# Patient Record
Sex: FEMALE | Race: WHITE | URBAN - METROPOLITAN AREA
[De-identification: names, ages, dates, MRNs, and addresses within clinical notes are randomized per-mention and may not be internally consistent; named-entity substitution may affect disease eponyms.]

---

## 2017-01-02 ENCOUNTER — APPOINTMENT (OUTPATIENT)
Dept: RADIATION ONCOLOGY | Facility: CLINIC | Age: 37
End: 2017-01-02
Attending: RADIOLOGY
Payer: COMMERCIAL

## 2017-01-02 PROCEDURE — 77412 RADIATION TX DELIVERY LVL 3: CPT | Performed by: RADIOLOGY

## 2017-01-03 ENCOUNTER — APPOINTMENT (OUTPATIENT)
Dept: RADIATION ONCOLOGY | Facility: CLINIC | Age: 37
End: 2017-01-03
Attending: RADIOLOGY
Payer: COMMERCIAL

## 2017-01-03 PROCEDURE — 77336 RADIATION PHYSICS CONSULT: CPT | Performed by: RADIOLOGY

## 2017-01-03 PROCEDURE — 77412 RADIATION TX DELIVERY LVL 3: CPT | Performed by: RADIOLOGY

## 2017-01-04 ENCOUNTER — APPOINTMENT (OUTPATIENT)
Dept: RADIATION ONCOLOGY | Facility: CLINIC | Age: 37
End: 2017-01-04
Attending: RADIOLOGY
Payer: COMMERCIAL

## 2017-01-04 PROCEDURE — 77412 RADIATION TX DELIVERY LVL 3: CPT | Performed by: RADIOLOGY

## 2017-01-05 ENCOUNTER — APPOINTMENT (OUTPATIENT)
Dept: RADIATION ONCOLOGY | Facility: CLINIC | Age: 37
End: 2017-01-05
Attending: RADIOLOGY
Payer: COMMERCIAL

## 2017-01-05 DIAGNOSIS — D05.11 DUCTAL CARCINOMA IN SITU (DCIS) OF RIGHT BREAST: Primary | ICD-10-CM

## 2017-01-05 PROCEDURE — 77280 THER RAD SIMULAJ FIELD SMPL: CPT | Performed by: RADIOLOGY

## 2017-01-05 PROCEDURE — 77412 RADIATION TX DELIVERY LVL 3: CPT | Performed by: RADIOLOGY

## 2017-01-05 NOTE — Clinical Note
Date:January 9, 2017      Provider requested that no letter be sent. Do not send.       [facility] Health Information

## 2017-01-05 NOTE — PROGRESS NOTES
[facility] PHYSICIANS  SPECIALIZING IN BREAKTHROUGHS  Radiation Oncology    On Treatment Visit Note      Soraida Shen      Date: 2017   MRN: [id number]   : 1980  Diagnosis: breast cancer      Reason for Visit:  On Radiation Treatment Visit     Treatment Summary to Date  Treatment Site: R chest Current Dose: 4490/5240 cGy Fractions:       Chemotherapy  Chemo concurrent with radx?: No    Subjective:   April is doing well.  She is getting used to her routine and no longer has significant anxiety. She does report increasing tenderness of her breast and swelling of her nipple.  She states that sometimes her breast looks purple, but the color does improve several hours after treatment.    Objective:   There were no vitals taken for this visit.  Gen: Appears well, in no acute distress  Skin: Mild diffuse erythema over treatment field    Assessment:    Tolerating radiation therapy well.  All questions and concerns addressed.    Toxicities:  Fatigue: Grade 0: No toxicity  Dermatitis: Grade 1: Faint erythema or dry desquamation    Plan:   1. Continue current therapy.    2. Reassured her that her skin reaction is well within the expected range.  For tenderness, she could take aleve.  3. Discussed radiation schedule.    Mosaiq chart and setup information reviewed  Ports checked    Medication Review  Med list reviewed with patient?: Yes    Educational Topic Discussed  Education Instructions: reviewed    Porfirio Whatley MD  Radiation Oncology Resident, PGY-2  [facility]  Phone: [phone]    I saw and examined the patient with the resident.  I have reviewed and agree with the resident's note and plan of care.      Chico Cerda MD    Please do not send letter to referring physician.

## 2017-01-05 NOTE — Clinical Note
2017       RE: Soraida Shen  [address]  [place] [address]     Dear Colleague,    Thank you for referring your patient, Soraida Shen, to the RADIATION ONCOLOGY CLINIC. Please see a copy of my visit note below.    [facility] PHYSICIANS  SPECIALIZING IN BREAKTHROUGHS  Radiation Oncology    On Treatment Visit Note      Soraida Shen      Date: 2017   MRN: [id number]   : 1980  Diagnosis: breast cancer      Reason for Visit:  On Radiation Treatment Visit     Treatment Summary to Date  Treatment Site: R chest Current Dose: 4490/5240 cGy Fractions:       Chemotherapy  Chemo concurrent with radx?: No    Subjective:   April is doing well.  She is getting used to her routine and no longer has significant anxiety. She does report increasing tenderness of her breast and swelling of her nipple.  She states that sometimes her breast looks purple, but the color does improve several hours after treatment.    Objective:   There were no vitals taken for this visit.  Gen: Appears well, in no acute distress  Skin: Mild diffuse erythema over treatment field    Assessment:    Tolerating radiation therapy well.  All questions and concerns addressed.    Toxicities:  Fatigue: Grade 0: No toxicity  Dermatitis: Grade 1: Faint erythema or dry desquamation    Plan:   1. Continue current therapy.    2. Reassured her that her skin reaction is well within the expected range.  For tenderness, she could take aleve.  3. Discussed radiation schedule.    Mosaiq chart and setup information reviewed  Ports checked    Medication Review  Med list reviewed with patient?: Yes    Educational Topic Discussed  Education Instructions: reviewed    Porfirio Whatley MD  Radiation Oncology Resident, PGY-2  [facility]  Phone: [phone]    I saw and examined the patient with the resident.  I have reviewed and agree with the resident's note and plan of care.      Chico Cerda,  MD    Please do not send letter to referring physician.      Again, thank you for allowing me to participate in the care of your patient.      Sincerely,    Chico Cerda MD

## 2017-01-06 ENCOUNTER — APPOINTMENT (OUTPATIENT)
Dept: RADIATION ONCOLOGY | Facility: CLINIC | Age: 37
End: 2017-01-06
Attending: RADIOLOGY
Payer: COMMERCIAL

## 2017-01-06 PROCEDURE — 77412 RADIATION TX DELIVERY LVL 3: CPT | Performed by: RADIOLOGY

## 2017-01-12 ENCOUNTER — OFFICE VISIT (OUTPATIENT)
Dept: RADIATION ONCOLOGY | Facility: CLINIC | Age: 37
End: 2017-01-12
Attending: RADIOLOGY
Payer: COMMERCIAL

## 2017-01-12 DIAGNOSIS — D05.11 DUCTAL CARCINOMA IN SITU (DCIS) OF RIGHT BREAST: Primary | ICD-10-CM

## 2017-01-12 DIAGNOSIS — L29.9 ITCHY SKIN: ICD-10-CM

## 2017-01-12 PROCEDURE — 77336 RADIATION PHYSICS CONSULT: CPT | Performed by: RADIOLOGY

## 2017-01-12 PROCEDURE — 77412 RADIATION TX DELIVERY LVL 3: CPT | Performed by: RADIOLOGY

## 2017-01-12 RX ORDER — HYDROCORTISONE 2.5 %
CREAM (GRAM) TOPICAL 2 TIMES DAILY
Qty: 90 G | Refills: 1 | Status: SHIPPED | OUTPATIENT
Start: 2017-01-12 | End: 2017-06-05

## 2017-01-12 NOTE — Clinical Note
2017       RE: Soraida Shen  [address]  [place] [address]     Dear Colleague,    Thank you for referring your patient, Soraida Shen, to the RADIATION ONCOLOGY CLINIC. Please see a copy of my visit note below.    [facility] PHYSICIANS  SPECIALIZING IN BREAKTHROUGHS  Radiation Oncology    On Treatment Visit Note      Soraida Shen      Date: 2017   MRN: [id number]   : 1980  Diagnosis: breast cancer      Reason for Visit:  On Radiation Treatment Visit     Treatment Summary to Date  Treatment Site: R chest Current Dose: 5240/5240 cGy Fractions:       Chemotherapy  Chemo concurrent with radx?: No    Subjective:   April completed treatment today.  She has an itchy rash over the treated breast which is not improved with 1% HC cream.  Her nipple feels dry.  She otherwise has no complaints.     Nursing ROS:   Nutrition Alteration  Diet Type: Patient's Preference  Nutrition Note: appetite good  Skin  Skin Reaction: 1 - Faint erythema or dry desquamation  Skin Note: will try some hydrocortisone as well as aquaphor (still itchy may want to try some 2.5% hydrocortisone cream)        Cardiovascular  Respiratory effort: 1 - Normal - without distress  Gastrointestinal  GI Note: WNL     Psychosocial  Mood - Anxiety: 1 - Mild mood alteration  Pain Assessment  0-10 Pain Scale: 4  Pain Note: see above      Objective:   There were no vitals taken for this visit.  Gen: Appears well, in no acute distress  Skin: Mild diffuse erythema over treatment field with erythematous papules.     Labs:  CBC RESULTS: No results for input(s): WBC, RBC, HGB, HCT, MCV, MCH, MCHC, RDW, PLT in the last 47938 hours.  ELECTROLYTES:  No results for input(s): NA, POTASSIUM, CHLORIDE, SANAZ, CO2, BUN, CR, GLC in the last 27083 hours.    Assessment:    Tolerating radiation therapy well.  All questions and concerns addressed.    Toxicities:  Dermatitis: Grade 1: Faint erythema or dry desquamation    Plan:    1. 2.5% HC cream for itching.  2. FU in 1 month.       Mosaiq chart and setup information reviewed  Ports checked    Medication Review  Med list reviewed with patient?: Yes    Educational Topic Discussed  Additional Instructions: D/C instrcutions per RN  Education Instructions: reviewed        Chico Cerda MD/PhD  [phone] clinic  Pager [phone]    Please do not send letter to referring physician.      Again, thank you for allowing me to participate in the care of your patient.      Sincerely,    Chico Cerda MD

## 2017-01-12 NOTE — Clinical Note
Date:January 13, 2017      Provider requested that no letter be sent. Do not send.       [facility] Health Information

## 2017-01-12 NOTE — MR AVS SNAPSHOT
After Visit Summary   1/12/2017 April Cindy Shen    MRN: [id number]           Patient Information     Date Of Birth          1980        Visit Information        Provider Department      1/12/2017 3:30 PM Chico Cerda MD Radiation Oncology Clinic        Today's Diagnoses     Ductal carcinoma in situ (DCIS) of right breast    -  1       Care Instructions    Continuing Management of the Effects of Radiation Treatment    The side effects of radiation therapy should gradually decrease in 2 to 3 weeks after you have finished radiation.  Some effects take longer to resolve.    Skin reactions:  Skin changes (such as redness or irritation) should begin to get better gradually.  Some people will have a permanent change in skin color.  Their skin may be more pink or  tan  than the untreated skin.  The skin may be thinner or more fragile than before treatment.  Continue to use a gentle moisturizing lotion for several months.  You should always protect the skin in the area that was treated by using sunscreen of spf 30 or higher.      Other skin care instructions:    Fatigue caused by radiation therapy will decrease and your energy will improve.    For concerns or questions call Department of Therapeutic Radiology [phone]        Follow-ups after your visit        Follow-up notes from your care team     Return in about 4 weeks (around 2/9/2017).      Your next 10 appointments already scheduled     Jan 12, 2017  3:30 PM   EXTERNAL RADIATION TREATMENT with [facility] RAD ONC VARIAN   Radiation Oncology Clinic ([facility])    [facility]  1st Floor  [address] [facility] [address] [phone]            Jan 12, 2017  3:30 PM   ON TREATMENT VISIT with Chico Cerda MD   Radiation Oncology Clinic ([facility])    [facility]  1st Floor  [address] [facility] [address]   [phone]            Jun 05, 2017  1:00 PM    (Arrive by 12:45 PM)   MA DIAGNOSTIC DIGITAL BILATERAL with UCBCMA2   [facility] ([facility])    [address] [address] [phone]           Do not use any powder, lotion or deodorant under your arms or on your breast. If you do, we will ask you to remove it before your exam.  Wear comfortable, two-piece clothing.  If you have any allergies, tell your care team.  Bring any previous mammograms from other facilities or have them mailed to the breast center.            Jun 05, 2017  2:15 PM   (Arrive by 2:00 PM)   Return Visit with Valerie Delgado MD   [facility] ([facility])    [address] [place] [address] [phone]              Who to contact     Please call your clinic at [phone] to:    Ask questions about your health    Make or cancel appointments    Discuss your medicines    Learn about your test results    Speak to your doctor   If you have compliments or concerns about an experience at your clinic, or if you wish to file a complaint, please contact [facility] Physicians Patient Relations at [phone] or email us at [email or web address]         Additional Information About Your Visit        [employer] Information     Momo gives you secure access to your electronic health record. If you see a primary care provider, you can also send messages to your care team and make appointments. If you have questions, please call your primary care clinic.  If you do not have a primary care provider, please call [phone] and they will assist you.      Momo is an electronic gateway that provides easy, online access to your medical records. With [employer], you can request a clinic appointment, read your test results, renew a prescription or communicate with your care team.     To access your existing account, please  contact your [facility]icians Clinic or call [phone] for assistance.        Care EveryWhere ID     This is your Care EveryWhere ID. This could be used by other organizations to access your [place] medical records  [id number]         Blood Pressure from Last 3 Encounters:   11/22/16 118/67   10/31/16 113/64   10/11/16 115/68    Weight from Last 3 Encounters:   12/22/16 80.74 kg (178 lb)   12/14/16 75.751 kg (167 lb)   10/11/16 77.157 kg (170 lb 1.6 oz)              Today, you had the following     No orders found for display       Primary Care Provider Office Phone # Fax #    Bita Perez [phone]       [address] [address]        Thank you!     Thank you for choosing RADIATION ONCOLOGY CLINIC  for your care. Our goal is always to provide you with excellent care. Hearing back from our patients is one way we can continue to improve our services. Please take a few minutes to complete the written survey that you may receive in the mail after your visit with us. Thank you!             Your Updated Medication List - Protect others around you: Learn how to safely use, store and throw away your medicines at www.disposemymeds.org.          This list is accurate as of: 1/12/17  3:00 PM.  Always use your most recent med list.                   Brand Name Dispense Instructions for use    bromocriptine 2.5 MG tablet    PARLODEL    180 tablet    Take 1 tablet (2.5 mg) by mouth 2 times daily       * LORazepam 0.25 MG Tabs half-tab    ATIVAN     Take 0.25 mg by mouth as needed       * LORazepam 0.5 MG tablet    ATIVAN    10 tablet    Take 1 tablet (0.5 mg) by mouth every 6 hours as needed (panic attack)       * Notice:  This list has 2 medication(s) that are the same as other medications prescribed for you. Read the directions carefully, and ask your doctor or other care provider to review them with you.

## 2017-01-12 NOTE — PROGRESS NOTES
[facility] PHYSICIANS  SPECIALIZING IN BREAKTHROUGHS  Radiation Oncology    On Treatment Visit Note      April Cindy Shen      Date: 2017   MRN: [id number]   : 1980  Diagnosis: breast cancer      Reason for Visit:  On Radiation Treatment Visit     Treatment Summary to Date  Treatment Site: R chest Current Dose: 5240/5240 cGy Fractions: 20/20      Chemotherapy  Chemo concurrent with radx?: No    Subjective:   April completed treatment today.  She has an itchy rash over the treated breast which is not improved with 1% HC cream.  Her nipple feels dry.  She otherwise has no complaints.     Nursing ROS:   Nutrition Alteration  Diet Type: Patient's Preference  Nutrition Note: appetite good  Skin  Skin Reaction: 1 - Faint erythema or dry desquamation  Skin Note: will try some hydrocortisone as well as aquaphor (still itchy may want to try some 2.5% hydrocortisone cream)        Cardiovascular  Respiratory effort: 1 - Normal - without distress  Gastrointestinal  GI Note: WNL     Psychosocial  Mood - Anxiety: 1 - Mild mood alteration  Pain Assessment  0-10 Pain Scale: 4  Pain Note: see above      Objective:   There were no vitals taken for this visit.  Gen: Appears well, in no acute distress  Skin: Mild diffuse erythema over treatment field with erythematous papules.     Labs:  CBC RESULTS: No results for input(s): WBC, RBC, HGB, HCT, MCV, MCH, MCHC, RDW, PLT in the last 06545 hours.  ELECTROLYTES:  No results for input(s): NA, POTASSIUM, CHLORIDE, SANAZ, CO2, BUN, CR, GLC in the last 72439 hours.    Assessment:    Tolerating radiation therapy well.  All questions and concerns addressed.    Toxicities:  Dermatitis: Grade 1: Faint erythema or dry desquamation    Plan:   1. 2.5% HC cream for itching.  2. FU in 1 month.       [employer] chart and setup information reviewed  Ports checked    Medication Review  Med list reviewed with patient?: Yes    Educational Topic Discussed  Additional Instructions: D/C  instrcutions per RN  Education Instructions: reviewed        Chico Cerda MD/PhD  [phone] clinic  Pager [phone]    Please do not send letter to referring physician.

## 2017-01-12 NOTE — PATIENT INSTRUCTIONS
Continuing Management of the Effects of Radiation Treatment    The side effects of radiation therapy should gradually decrease in 2 to 3 weeks after you have finished radiation.  Some effects take longer to resolve.    Skin reactions:  Skin changes (such as redness or irritation) should begin to get better gradually.  Some people will have a permanent change in skin color.  Their skin may be more pink or  tan  than the untreated skin.  The skin may be thinner or more fragile than before treatment.  Continue to use a gentle moisturizing lotion for several months.  You should always protect the skin in the area that was treated by using sunscreen of spf 30 or higher.      Other skin care instructions:    Fatigue caused by radiation therapy will decrease and your energy will improve.    For concerns or questions call Department of Therapeutic Radiology [phone]

## 2017-01-16 ENCOUNTER — ONCOLOGY VISIT (OUTPATIENT)
Dept: RADIATION ONCOLOGY | Facility: CLINIC | Age: 37
End: 2017-01-16

## 2017-01-21 NOTE — PROCEDURES
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     Treatment Summary:  Radiation Oncology - Course: 1 Protocol:   Treatment Site Dose             Modality From To Days Fx.  1 R breast  4,240 cGy     6X/10X 12/13/2016  2017  22 16  1 R breast boost  1,000 cGy e12  2017   7  4             Dose per Fraction: 265 / 250 cGy     Total Dose: 5240 cGy              COMMENTS:   Ms. Reid is a 36-year-old woman with a history of a prolactin-secreting micro pituitary adenoma   with a newly diagnosed low-grade DCIS of the lower outer quadrant of the right breast, status post   lumpectomy, 0.3 cm with 1 mm margin, ER/NC positive, [id number] (Stage 0).  She underwent adjuvant whole   breast radiotherapy to the right breast as above.  She tolerated treatment well with no unexpected treatment   breaks. The patient had toxicities of a maximum of grade 1 skin reaction and grade 1 anxiety by CTC AE v4.0   criteria.  Additionally she had grade 1 respiratory symptoms during treatment, although this was unlikely related   to radiotherapy administration.  She managed her skin reaction with 2.5% hydrocortisone cream.      PAIN MANAGEMENT:  The patient did not require pain management above OTC medication during treatment.    She will be released to the care of Medical Oncology for any further pain management.                              FOLLOW UP PLAN:  The patient will return for follow-up in radiation oncology in approximately 1 month.                               Resident Physician:  Porfirio Whatley  M.D.   Staff Physician: Chico Cerda M.D.  Physicist: Horacio Raygoza, PhD     CC:   MD Valerie Worley MD Jane Hui, MD                                      Radiation Oncology:  [facility] [address] [place] [address]

## 2017-02-13 ENCOUNTER — OFFICE VISIT (OUTPATIENT)
Dept: RADIATION ONCOLOGY | Facility: CLINIC | Age: 37
End: 2017-02-13
Attending: RADIOLOGY
Payer: COMMERCIAL

## 2017-02-13 DIAGNOSIS — D05.11 DUCTAL CARCINOMA IN SITU (DCIS) OF RIGHT BREAST: Primary | ICD-10-CM

## 2017-02-13 PROCEDURE — 99214 OFFICE O/P EST MOD 30 MIN: CPT | Performed by: RADIOLOGY

## 2017-02-13 NOTE — MR AVS SNAPSHOT
After Visit Summary   2/13/2017    Soraida Shen    MRN: [id number]           Patient Information     Date Of Birth          1980        Visit Information        Provider Department      2/13/2017 3:30 PM Chico Cerda MD Radiation Oncology Clinic        Today's Diagnoses     Ductal carcinoma in situ (DCIS) of right breast    -  1       Follow-ups after your visit        Your next 10 appointments already scheduled     Jun 05, 2017  1:00 PM CDT   (Arrive by 12:45 PM)   MA DIAGNOSTIC DIGITAL BILATERAL with UCBCMA2   [facility] [place] [facility] ([facility])    [address] [place] [address] [phone]           Do not use any powder, lotion or deodorant under your arms or on your breast. If you do, we will ask you to remove it before your exam.  Wear comfortable, two-piece clothing.  If you have any allergies, tell your care team.  Bring any previous mammograms from other facilities or have them mailed to the breast center.            Jun 05, 2017  2:15 PM CDT   (Arrive by 2:00 PM)   Return Visit with Valerie Delgado MD   [facility] ([facility])    [address] [place] [address] [phone]              Who to contact     Please call your clinic at [phone] to:    Ask questions about your health    Make or cancel appointments    Discuss your medicines    Learn about your test results    Speak to your doctor   If you have compliments or concerns about an experience at your clinic, or if you wish to file a complaint, please contact [facility] Physicians Patient Relations at [phone] or email us at [email or web address]         Additional Information About Your Visit        MyChart Information     [employer] gives you secure access to your electronic health record. If you see a primary care provider, you can also send  messages to your care team and make appointments. If you have questions, please call your primary care clinic.  If you do not have a primary care provider, please call [phone] and they will assist you.      [employer] is an electronic gateway that provides easy, online access to your medical records. With [employer], you can request a clinic appointment, read your test results, renew a prescription or communicate with your care team.     To access your existing account, please contact your [facility] Physicians Clinic or call [phone] for assistance.        Care EveryWhere ID     This is your Care EveryWhere ID. This could be used by other organizations to access your [place] medical records  [id number]         Blood Pressure from Last 3 Encounters:   11/22/16 118/67   10/31/16 113/64   10/11/16 115/68    Weight from Last 3 Encounters:   12/22/16 80.7 kg (178 lb)   12/14/16 75.8 kg (167 lb)   10/11/16 77.2 kg (170 lb 1.6 oz)              Today, you had the following     No orders found for display       Primary Care Provider Office Phone # Fax #    Bita Perez [phone]       [place] [id number]        Thank you!     Thank you for choosing RADIATION ONCOLOGY CLINIC  for your care. Our goal is always to provide you with excellent care. Hearing back from our patients is one way we can continue to improve our services. Please take a few minutes to complete the written survey that you may receive in the mail after your visit with us. Thank you!             Your Updated Medication List - Protect others around you: Learn how to safely use, store and throw away your medicines at www.disposemymeds.org.          This list is accurate as of: 2/13/17 11:59 PM.  Always use your most recent med list.                   Brand Name Dispense Instructions for use    bromocriptine 2.5 MG tablet    PARLODEL    180 tablet    Take 1 tablet (2.5 mg) by  mouth 2 times daily       hydrocortisone 2.5 % cream     90 g    Apply topically 2 times daily       * LORazepam 0.25 MG Tabs half-tab    ATIVAN     Take 0.25 mg by mouth as needed       * LORazepam 0.5 MG tablet    ATIVAN    10 tablet    Take 1 tablet (0.5 mg) by mouth every 6 hours as needed (panic attack)       * Notice:  This list has 2 medication(s) that are the same as other medications prescribed for you. Read the directions carefully, and ask your doctor or other care provider to review them with you.

## 2017-02-13 NOTE — LETTER
2017       RE: Soraida Shen  [address]  [facility] [address]     Dear Colleague,    Thank you for referring your patient, Soraida Shen, to the RADIATION ONCOLOGY CLINIC. Please see a copy of my visit note below.    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 Right breast: 4240 cGy in 16 fractions (16-17)  1b. R breast boost: 1000 cGy in 4 fractions (17-17)    INTERVAL SINCE COMPLETION OF RADIATION THERAPY: ~ 1 week    ONCOLOGIC HISTORY:   Ms. Shen is a 37-year-old woman who initially presented about 1 year ago with a 1-month history of right nipple discharge that was clear and yellow.  She had a mammogram and ultrasound of the right breast on 2015, which showed prominent ducts in the subareolar right breast with elicitation of clear discharge during the exam.  She then had a further evaluation on 2015 with contrast-enhanced mammogram revealing a 9 mm round mass at the 7-o'clock position 8-9 cm from the nipple in the right breast.  Ultrasound showed dilated ducts at 8:30-o'clock position 5 cm from the right nipple with filling deficits in the ducts measuring a total of 1.2 x 0.7 x 0.7 cm.  This was biopsied and pathology ([id number]) showed intraductal papilloma with no atypical or malignant findings.  She was told that depending on the surgeon she went to she may or may not be recommended to have lumpectomy, and therefore, she did not pursue further consultation at that time.       In early summer 2016, she established care with a primary physician who recommended that she have additional workup and that this should not be neglected.  Therefore, a mammogram and ultrasound was obtained 2016 where the papilloma was again seen in the 8:30-o'clock position 5 cm from the nipple in the right  breast with a dilated duct and a papilloma measuring 0.5 x 0.6 x 0.6 cm not significantly changed from the 2015 exam.  Due to these persistent findings, she was recommended to undergo right breast lumpectomy which was performed 09/28/2016, and pathology ([id number]) demonstrated a ductal carcinoma in situ with low-grade papillary cribriform and solid types developed within the intraductal papilloma measuring 0.3 cm in greatest linear extent.  No invasive malignancy was seen.  The closest surgical margin was 1 mm from DCIS.  It was ER/MD positive with greater than 95% moderate to strong intensity    Ms. Shen was initially hesitant about radiation therapy but utimately did proceed with adjuvant radiotherapy as detailed above.  She tolerated the treatment well with no treatment breaks.  She had a skin reaction which was managed with 2.5% hydrocorisone cream.     SUBJECTIVE:   Soraida Shen reports that she is doing well since the completion of RT.  She is not receiving any adjuvant hormonal therapy by choice.  She reports that her skin has healed back to normal condition. She continues to have very mild hypopigmentation of the aereola of her right (radiated) breast in comparison to her left breast    PHYSICAL EXAM:  Vital Signs: There were no vitals taken for this visit.  Gen: Alert, in NAD  Eyes: EOMI, sclera anicteric  Head: NC/AT  Ears: No external auricular lesions  Nose/sinus: No rhinorrhea or epistaxis  Oral Cavity/Oropharynx: MMM, no thrush noted  Pulm: Breathing comfortably on room air, no audible wheezes or ronchi  CV: Well-perfused, no cyanosis  Abdominal: Soft, nondistended  Skin: Normal color and turgor  Neurologic/Psych/MSK: A/Ox3, normal gait  Breast: Right breast with some hypopigmentation of the aeroela noted.  Some hyperpigmentation of the skin within the treatment field.  No erythema, no rash. No tenderness to palpation.    LABS AND IMAGING:  Reviewed.    IMPRESSION:   Ms. Shen is a 37 year old female  with diagnosis of ER+/CT+, low volume (0.3cm by greatest dimension) DICS of the right beast status post lumpectomy with close surgical margin (1mm).     PLAN:   1. Return to clinic in 6 months  2. Continued follow up with imaging at care of Dr. Delgado   3. Continue skin cares    Ms. Shen was seen and discussed with staff, Dr. Prashant Whatley MD  Radiation Oncology Resident, PGY-2  [facility]  Phone: [phone]    I saw and examined the patient with the resident.  I have reviewed and agree with the resident's note and plan of care.      Chico Cerda MD        HPI  FOLLOW-UP VISIT    Patient Name: April Cindy Shen      : 1980     Age: 37 year old        ______________________________________________________________________________     Chief Complaint   Patient presents with     Cancer     F/U for radiation to the breast     There were no vitals taken for this visit.     Date Radiation Completed: Right Breast 5240 cGy completed on 17    Pain  Denies    Labs  Other Labs: No    Imaging  None      Other Appointments: No    MD Name:  Appointment Date:    MD Name: Appointment Date:   MD Name: Appointment Date:   Other Appointment Notes:     Residual Radiation side effect: Skin healing but still discolored. Continues to feel tired.  Not sleeping well    Additional Instructions: Mother has a mass on her ovaries and will be getting further evaluation.      Again, thank you for allowing me to participate in the care of your patient.      Sincerely,    Chico Cerda MD

## 2017-02-13 NOTE — PROGRESS NOTES
Radiation Oncology Follow-up Visit  2017    Patient Name: Soraida Shen  MRN: [id number]   : 1980     DISEASE TREATED: DCIS (Tis N0 M0) of the right breast     RADIATION THERAPY DELIVERED:   1a. Right breast: 4240 cGy in 16 fractions (16-17)  1b. R breast boost: 1000 cGy in 4 fractions (17-17)    INTERVAL SINCE COMPLETION OF RADIATION THERAPY: ~ 1 week    ONCOLOGIC HISTORY:   Ms. Shen is a 37-year-old woman who initially presented about 1 year ago with a 1-month history of right nipple discharge that was clear and yellow.  She had a mammogram and ultrasound of the right breast on 2015, which showed prominent ducts in the subareolar right breast with elicitation of clear discharge during the exam.  She then had a further evaluation on 2015 with contrast-enhanced mammogram revealing a 9 mm round mass at the 7-o'clock position 8-9 cm from the nipple in the right breast.  Ultrasound showed dilated ducts at 8:30-o'clock position 5 cm from the right nipple with filling deficits in the ducts measuring a total of 1.2 x 0.7 x 0.7 cm.  This was biopsied and pathology ([id number]) showed intraductal papilloma with no atypical or malignant findings.  She was told that depending on the surgeon she went to she may or may not be recommended to have lumpectomy, and therefore, she did not pursue further consultation at that time.       In early summer 2016, she established care with a primary physician who recommended that she have additional workup and that this should not be neglected.  Therefore, a mammogram and ultrasound was obtained 2016 where the papilloma was again seen in the 8:30-o'clock position 5 cm from the nipple in the right breast with a dilated duct and a papilloma measuring 0.5 x 0.6 x 0.6 cm not significantly changed from the  exam.  Due to these persistent findings, she was recommended to undergo right breast lumpectomy which was performed 2016,  and pathology ([id number]) demonstrated a ductal carcinoma in situ with low-grade papillary cribriform and solid types developed within the intraductal papilloma measuring 0.3 cm in greatest linear extent.  No invasive malignancy was seen.  The closest surgical margin was 1 mm from DCIS.  It was ER/WY positive with greater than 95% moderate to strong intensity    Ms. Shen was initially hesitant about radiation therapy but utimately did proceed with adjuvant radiotherapy as detailed above.  She tolerated the treatment well with no treatment breaks.  She had a skin reaction which was managed with 2.5% hydrocorisone cream.     SUBJECTIVE:   Soraida Shen reports that she is doing well since the completion of RT.  She is not receiving any adjuvant hormonal therapy by choice.  She reports that her skin has healed back to normal condition. She continues to have very mild hypopigmentation of the aereola of her right (radiated) breast in comparison to her left breast    PHYSICAL EXAM:  Vital Signs: There were no vitals taken for this visit.  Gen: Alert, in NAD  Eyes: EOMI, sclera anicteric  Head: NC/AT  Ears: No external auricular lesions  Nose/sinus: No rhinorrhea or epistaxis  Oral Cavity/Oropharynx: MMM, no thrush noted  Pulm: Breathing comfortably on room air, no audible wheezes or ronchi  CV: Well-perfused, no cyanosis  Abdominal: Soft, nondistended  Skin: Normal color and turgor  Neurologic/Psych/MSK: A/Ox3, normal gait  Breast: Right breast with some hypopigmentation of the aeroela noted.  Some hyperpigmentation of the skin within the treatment field.  No erythema, no rash. No tenderness to palpation.    LABS AND IMAGING:  Reviewed.    IMPRESSION:   Ms. Shen is a 37 year old female with diagnosis of ER+/WY+, low volume (0.3cm by greatest dimension) DICS of the right beast status post lumpectomy with close surgical margin (1mm).     PLAN:   1. Return to clinic in 6 months  2. Continued follow up with imaging at care of  Dr. Delgado   3. Continue skin cares    Ms. Shen was seen and discussed with staff, Dr. Prashant Whatley MD  Radiation Oncology Resident, PGY-2  [facility]  Phone: [phone]    I saw and examined the patient with the resident.  I have reviewed and agree with the resident's note and plan of care.      Chico Cerda MD

## 2017-02-13 NOTE — PROGRESS NOTES
HPI  FOLLOW-UP VISIT    Patient Name: April Cindy Shen      : 1980     Age: 37 year old        ______________________________________________________________________________     Chief Complaint   Patient presents with     Cancer     F/U for radiation to the breast     There were no vitals taken for this visit.     Date Radiation Completed: Right Breast 5240 cGy completed on 17    Pain  Denies    Labs  Other Labs: No    Imaging  None      Other Appointments: No    MD Name:  Appointment Date:    MD Name: Appointment Date:   MD Name: Appointment Date:   Other Appointment Notes:     Residual Radiation side effect: Skin healing but still discolored. Continues to feel tired.  Not sleeping well    Additional Instructions: Mother has a mass on her ovaries and will be getting further evaluation.              ROS

## 2017-05-26 ENCOUNTER — HEALTH MAINTENANCE LETTER (OUTPATIENT)
Age: 37
End: 2017-05-26

## 2017-06-04 NOTE — PROGRESS NOTES
Oncology Follow Up:  Date on this visit: 6/5/2017    Diagnosis:  [id number], low-grade, ER/NM positive, solid and cribriform type, right breast DCIS.    Primary Physician: Bita Perez     History Of Present Illness:  Ms. Shen is a 37 year old female with a h/o pituitary microadenoma with right breast DCIS.  Ms. Shen first noted a clear/yellowish discharge from the right nipple in the fall of 2015.  Mammogram showed no concerning findings.  Right breast ultrasound showed prominent ducts in the subareolar right breast.  Ductogram was attempted but was unsuccessful.  Contrast enhanced mammogram showed a 9 mm mass in the lateral right breast at the 7 o'clock position, 8-9 cm from the nipple.  Ultrasound showed dilated ducts with filling defects at the 8:30 position, 5 cm from the nipple.  The most prominent filling defect measured 1.2 cm.  Right breast biopsy showed intraductal papilloma without atypia.  She was referred to see a surgeon.  She was told that one of our surgeons removes these and the other does not.  Because she was told that one of our surgeons does not remove them, she did not feel it was important to see a surgeon.      In the summer of 2016, she saw her PCP, who recommended she follow through with seeing a surgeon.  Repeat right breast ultrasound showed mass at the area of previous biopsy to measure 6 mm and was felt to be unchanged from prior.  On 9/28/16, right breast excisional biopsy was performed by Dr. Dorantes.  Pathology showed low grade, papillary, solid, and cribriform type DCIS measuring 3 mm.  No comedonecrosis.  Surgical margin was negative, but <1 mm.  Estrogen and progesterone receptor staining were positive in >95% of tumor cells.  The focus of DCIS was within intraductal papilloma.  She received 4240 cGy in 16 fractions (12/13/16-1/4/17) to the right breast and boost of 1000 cGy in 4 fractions (1/5/17-1/12/17) to the tumor site.  She declined adjuvant endocrine therapy due to h/o mood  disorder.    Ms. Shen had genetic counseling and BreastNext testing returned negative.     Interval History:  April comes into clinic for routine DCIS follow up.  Since our last visit, she completed a course of radiation to the right breast.  She states that overall radiation went very well for her.  She states the radiation technologists were wonderful and made the experience go as well as it could have.  She reports significant anxiety regarding our visit today and, in fact, is tearful during the visit.  She has a h/o anxiety and post-traumatic stress.  She states this visit brings her back to when she learned of her breast cancer diagnosis.  She feels she did not have enough support during this period of time.  Due to anxiety over today's visit, she took off work for the day.  She reports hyperpigmentation in the area of her breast radiation.  She has intermittent twinges of pain in the lower outer right breast.  These are not bothersome enough to take pain medication.  She denies breast lumps or masses.  She has no cough, shortness of breath, or chest pain.  She denies new bone or joint aches or pains.  She has no abdominal complaints.  She denies swelling of her lower extremities.  She is not currently on an antidepressant or an anxiolytic as she prefers to deal with anxiety in more natural ways such as exercise.  She is not currently seeing a psychologist.  The remainder of a 10 point review of systems is otherwise negative.      Past Medical/Surgical History:  Past Medical History:   Diagnosis Date     Anxiety      Breast CA (H)      Depression      Intraductal papilloma of breast 2015    right; biopsy proven 11/25/15     Peptic ulcer disease      Pituitary microadenoma (H) 2010    6 mm      Prolactinoma (H)     6 mm pituitary mass     Past Surgical History:   Procedure Laterality Date     LUMPECTOMY BREAST Right 9/28/2016    Procedure: LUMPECTOMY BREAST;  Surgeon: Andreia Dorantes MD;  Location:  OR  "    wisdom teeth extraction       Allergies:  Allergies as of 06/05/2017 - Nacho as Reviewed 02/13/2017   Allergen Reaction Noted     Fruit & vegetable Hives 09/06/2016     Current Medications:  Current Outpatient Prescriptions   Medication Sig Dispense Refill     hydrocortisone 2.5 % cream Apply topically 2 times daily 90 g 1     LORazepam (ATIVAN) 0.5 MG tablet Take 1 tablet (0.5 mg) by mouth every 6 hours as needed (panic attack) 10 tablet 0     bromocriptine (PARLODEL) 2.5 MG tablet Take 1 tablet (2.5 mg) by mouth 2 times daily 180 tablet 3     LORazepam (ATIVAN) 0.25 MG TABS Take 0.25 mg by mouth as needed         Family History and Social History:  Reviewed and unchanged from prior.  Please see initial consultation dated 2/13/17 for further details.    Physical Exam:  /60 (BP Location: Left arm, Patient Position: Chair, Cuff Size: Adult Regular)  Pulse 56  Temp 97.9  F (36.6  C) (Oral)  Resp 16  Ht 1.66 m (5' 5.35\")  Wt 80 kg (176 lb 4.8 oz)  SpO2 96%  BMI 29.02 kg/m2  General:  Well appearing, well-nourished adult female in NAD.  HEENT:  Normocephalic.  Sclera anicteric.  MMM.  No lesions of the oropharynx.  Lymph:  No cervical, supraclavicular, or axillary LAD.  Chest:  CTA bilaterally.  No wheezes or crackles.  CV:  RRR.  Nl S1 and S2.  No m/r/g.  Breast:  Bilateral breasts are of increased fibroglandular density.  There is hyperpigmentation of the right breast in the distribution of the radiation field.  Right breast is overall more firm than the left.  Right breast lateral lumpectomy incision is without significant underlying fibrosis.  There are no discretely palpable masses in either breast.  Bilateral nipples are everted.  No nipple discharge.  Abd:  Soft/NT/ND.  BSs normoactive.  No hepatosplenomegaly.  Ext:  No pitting edema of the bilateral lower extremities.  Pulses 2+ and symmetric.  Musculo:  Strength 5/5 throughout.  Neuro:  Cranial nerves grossly intact.  Psych:  Mood and affect " appear normal.    Laboratory/Imaging Studies:  6/5/17 Bilateral diagnostic mammogram:    Findings:  There are no suspicious findings.  There are posttreatment changes in  the right breast.         Impression: BI-RADS CATEGORY: 2 - Benign Findings..    ASSESSMENT/PLAN:  April is a 38 yo premenopausal female with a stage 0, [id number], low-grade, 3 mm, cribriform, papillary, and solid type, ER positive, IA positive, DCIS of the right breast.  She completed right breast radiation in 01/2017.     1.  Right breast DCIS:  There is no evidence of disease recurrence on either clinical breast exam or mammogram performed today.  I will see her back in 6 months for her next visit.  Given increased breast density and young age at diagnosis, I am recommending high risk screening with both annual mammogram with tomosynthesis and breast MRI spaced so that she is having some form of imaging every 6 months.  She will be due for breast MRI at the time of her return visit in 6 months.  She would like to limit her visits to the [place], therefore will schedule breast MRI for the same day as her return visit.    We reviewed surveillance plan which is clinic visits with breast exam every 6 months.  Given increased breast density and young age at diagnosis we will perform high risk breast screening with both annual mammogram and breast MRI spaced so that she is having some form of imaging every 6 months.  We discussed lifestyle changes shown to reduce breast cancer recurrence including 150 minutes of cardiovascular activity per week, maintaining a normal BMI, a diet low in saturated fat, a daily baby aspirin, vitamin d supplementation and limiting alcohol to less than 3-4 drinks per week.    2.  Anxiety:  We spent quite some time today discussing her symptoms and potential treatments.  She declines antidepressants or anxiolytics, favoring exercise instead.  She declines referral to psychology at this time.  She is wondering if it may be  better if she receives care at another facility, but is uncertain as to whether she would like to transfer care.  I asked that she let me know what I can do to help her get through her breast cancer surveillance visits such as referral to psychology, anxiolytics, vs aiding her in transferring care to another facility.  She will continue regular exercise for now.    3.  Difficulty losing weight:  She reports difficulty losing weight despite healthy diet and regular exercise in the past year.  We discussed slow decline in metabolism starting at age 36 yo and effects of stress on weight.  She declines referral to a nutritionist or weight management program at this time.  I recommended she add light weights to her exercise routine and continue to eat a diet low in saturated fat and refined sugar.    4.  Follow Up:  Return in 6 months for breast MRI and visit with me.    It was a pleasure to see April in clinic today.  A total of 25 minutes of our 30 minute face to face visit was spent in counseling.

## 2017-06-05 ENCOUNTER — ONCOLOGY VISIT (OUTPATIENT)
Dept: ONCOLOGY | Facility: CLINIC | Age: 37
End: 2017-06-05
Attending: INTERNAL MEDICINE
Payer: COMMERCIAL

## 2017-06-05 VITALS
WEIGHT: 176.3 LBS | DIASTOLIC BLOOD PRESSURE: 60 MMHG | HEIGHT: 65 IN | BODY MASS INDEX: 29.37 KG/M2 | TEMPERATURE: 97.9 F | SYSTOLIC BLOOD PRESSURE: 100 MMHG | RESPIRATION RATE: 16 BRPM | OXYGEN SATURATION: 96 % | HEART RATE: 56 BPM

## 2017-06-05 DIAGNOSIS — Z12.39 BREAST CANCER SCREENING, HIGH RISK PATIENT: ICD-10-CM

## 2017-06-05 DIAGNOSIS — D49.7 PITUITARY TUMOR: ICD-10-CM

## 2017-06-05 DIAGNOSIS — D05.11 DUCTAL CARCINOMA IN SITU (DCIS) OF RIGHT BREAST: Primary | ICD-10-CM

## 2017-06-05 DIAGNOSIS — D35.2 PROLACTINOMA (H): ICD-10-CM

## 2017-06-05 DIAGNOSIS — R92.30 DENSE BREASTS: ICD-10-CM

## 2017-06-05 LAB — PROLACTIN SERPL-MCNC: 23 UG/L (ref 3–27)

## 2017-06-05 PROCEDURE — 99212 OFFICE O/P EST SF 10 MIN: CPT | Mod: ZF

## 2017-06-05 PROCEDURE — 99214 OFFICE O/P EST MOD 30 MIN: CPT | Mod: ZP | Performed by: INTERNAL MEDICINE

## 2017-06-05 ASSESSMENT — PAIN SCALES - GENERAL: PAINLEVEL: NO PAIN (0)

## 2017-06-05 NOTE — NURSING NOTE
"Oncology Rooming Note    June 5, 2017 2:16 PM   April Cindy Shen is a 37 year old female who presents for:    Chief Complaint   Patient presents with     Oncology Clinic Visit     Breast Ca- 6 month f/U     Initial Vitals: /60 (BP Location: Left arm, Patient Position: Chair, Cuff Size: Adult Regular)  Pulse 56  Temp 97.9  F (36.6  C) (Oral)  Resp 16  Ht 1.66 m (5' 5.35\")  Wt 80 kg (176 lb 4.8 oz)  SpO2 96%  BMI 29.02 kg/m2 Estimated body mass index is 29.02 kg/(m^2) as calculated from the following:    Height as of this encounter: 1.66 m (5' 5.35\").    Weight as of this encounter: 80 kg (176 lb 4.8 oz). Body surface area is 1.92 meters squared.  No Pain (0) Comment: Data Unavailable   No LMP recorded. Patient is not currently having periods (Reason: IUD).  Allergies reviewed: Yes  Medications reviewed: Yes    Medications: Medication refills not needed today.  Pharmacy name entered into [facility]: [facility] [address] [place] [address]    Clinical concerns: no clinical concerns  provider was notified.    7 minutes for nursing intake (face to face time)     Sheron Casey CMA              "

## 2017-06-05 NOTE — LETTER
6/5/2017       RE: Soraida Shen  [address]  [facility] [address]     Dear Colleague,    Thank you for referring your patient, Soraida Shen, to the [facility]. Please see a copy of my visit note below.    Oncology Follow Up:  Date on this visit: 6/5/2017    Diagnosis:  [id number], low-grade, ER/SC positive, solid and cribriform type, right breast DCIS.    Primary Physician: Bita Perez     History Of Present Illness:  Ms. Shen is a 37 year old female with a h/o pituitary microadenoma with right breast DCIS.  Ms. Shen first noted a clear/yellowish discharge from the right nipple in the fall of 2015.  Mammogram showed no concerning findings.  Right breast ultrasound showed prominent ducts in the subareolar right breast.  Ductogram was attempted but was unsuccessful.  Contrast enhanced mammogram showed a 9 mm mass in the lateral right breast at the 7 o'clock position, 8-9 cm from the nipple.  Ultrasound showed dilated ducts with filling defects at the 8:30 position, 5 cm from the nipple.  The most prominent filling defect measured 1.2 cm.  Right breast biopsy showed intraductal papilloma without atypia.  She was referred to see a surgeon.  She was told that one of our surgeons removes these and the other does not.  Because she was told that one of our surgeons does not remove them, she did not feel it was important to see a surgeon.      In the summer of 2016, she saw her PCP, who recommended she follow through with seeing a surgeon.  Repeat right breast ultrasound showed mass at the area of previous biopsy to measure 6 mm and was felt to be unchanged from prior.  On 9/28/16, right breast excisional biopsy was performed by Dr. Dorantes.  Pathology showed low grade, papillary, solid, and cribriform type DCIS measuring 3 mm.  No comedonecrosis.  Surgical margin was negative, but <1 mm.  Estrogen and progesterone receptor staining were positive in >95% of tumor cells.  The focus of DCIS was  within intraductal papilloma.  She received 4240 cGy in 16 fractions (12/13/16-1/4/17) to the right breast and boost of 1000 cGy in 4 fractions (1/5/17-1/12/17) to the tumor site.  She declined adjuvant endocrine therapy due to h/o mood disorder.    Ms. Shen had genetic counseling and BreastNext testing returned negative.     Interval History:  April comes into clinic for routine DCIS follow up.  Since our last visit, she completed a course of radiation to the right breast.  She states that overall radiation went very well for her.  She states the radiation technologists were wonderful and made the experience go as well as it could have.  She reports significant anxiety regarding our visit today and, in fact, is tearful during the visit.  She has a h/o anxiety and post-traumatic stress.  She states this visit brings her back to when she learned of her breast cancer diagnosis.  She feels she did not have enough support during this period of time.  Due to anxiety over today's visit, she took off work for the day.  She reports hyperpigmentation in the area of her breast radiation.  She has intermittent twinges of pain in the lower outer right breast.  These are not bothersome enough to take pain medication.  She denies breast lumps or masses.  She has no cough, shortness of breath, or chest pain.  She denies new bone or joint aches or pains.  She has no abdominal complaints.  She denies swelling of her lower extremities.  She is not currently on an antidepressant or an anxiolytic as she prefers to deal with anxiety in more natural ways such as exercise.  She is not currently seeing a psychologist.  The remainder of a 10 point review of systems is otherwise negative.      Past Medical/Surgical History:  Past Medical History:   Diagnosis Date     Anxiety      Breast CA (H)      Depression      Intraductal papilloma of breast 2015    right; biopsy proven 11/25/15     Peptic ulcer disease      Pituitary microadenoma (H)  "2010    6 mm      Prolactinoma (H)     6 mm pituitary mass     Past Surgical History:   Procedure Laterality Date     LUMPECTOMY BREAST Right 9/28/2016    Procedure: LUMPECTOMY BREAST;  Surgeon: Andreia Dorantes MD;  Location: [facility] OR     wisdom teeth extraction       Allergies:  Allergies as of 06/05/2017 - Nacho as Reviewed 02/13/2017   Allergen Reaction Noted     Fruit & vegetable Hives 09/06/2016     Current Medications:  Current Outpatient Prescriptions   Medication Sig Dispense Refill     hydrocortisone 2.5 % cream Apply topically 2 times daily 90 g 1     LORazepam (ATIVAN) 0.5 MG tablet Take 1 tablet (0.5 mg) by mouth every 6 hours as needed (panic attack) 10 tablet 0     bromocriptine (PARLODEL) 2.5 MG tablet Take 1 tablet (2.5 mg) by mouth 2 times daily 180 tablet 3     LORazepam (ATIVAN) 0.25 MG TABS Take 0.25 mg by mouth as needed         Family History and Social History:  Reviewed and unchanged from prior.  Please see initial consultation dated 2/13/17 for further details.    Physical Exam:  /60 (BP Location: Left arm, Patient Position: Chair, Cuff Size: Adult Regular)  Pulse 56  Temp 97.9  F (36.6  C) (Oral)  Resp 16  Ht 1.66 m (5' 5.35\")  Wt 80 kg (176 lb 4.8 oz)  SpO2 96%  BMI 29.02 kg/m2  General:  Well appearing, well-nourished adult female in NAD.  HEENT:  Normocephalic.  Sclera anicteric.  MMM.  No lesions of the oropharynx.  Lymph:  No cervical, supraclavicular, or axillary LAD.  Chest:  CTA bilaterally.  No wheezes or crackles.  CV:  RRR.  Nl S1 and S2.  No m/r/g.  Breast:  Bilateral breasts are of increased fibroglandular density.  There is hyperpigmentation of the right breast in the distribution of the radiation field.  Right breast is overall more firm than the left.  Right breast lateral lumpectomy incision is without significant underlying fibrosis.  There are no discretely palpable masses in either breast.  Bilateral nipples are everted.  No nipple discharge.  Abd:  " Soft/NT/ND.  BSs normoactive.  No hepatosplenomegaly.  Ext:  No pitting edema of the bilateral lower extremities.  Pulses 2+ and symmetric.  Musculo:  Strength 5/5 throughout.  Neuro:  Cranial nerves grossly intact.  Psych:  Mood and affect appear normal.    Laboratory/Imaging Studies:  6/5/17 Bilateral diagnostic mammogram:    Findings:  There are no suspicious findings.  There are posttreatment changes in  the right breast.         Impression: BI-RADS CATEGORY: 2 - Benign Findings..    ASSESSMENT/PLAN:  April is a 38 yo premenopausal female with a stage 0, [id number], low-grade, 3 mm, cribriform, papillary, and solid type, ER positive, SD positive, DCIS of the right breast.  She completed right breast radiation in 01/2017.     1.  Right breast DCIS:  There is no evidence of disease recurrence on either clinical breast exam or mammogram performed today.  I will see her back in 6 months for her next visit.  Given increased breast density and young age at diagnosis, I am recommending high risk screening with both annual mammogram with tomosynthesis and breast MRI spaced so that she is having some form of imaging every 6 months.  She will be due for breast MRI at the time of her return visit in 6 months.  She would like to limit her visits to the [facility], therefore will schedule breast MRI for the same day as her return visit.    We reviewed surveillance plan which is clinic visits with breast exam every 6 months.  Given increased breast density and young age at diagnosis we will perform high risk breast screening with both annual mammogram and breast MRI spaced so that she is having some form of imaging every 6 months.  We discussed lifestyle changes shown to reduce breast cancer recurrence including 150 minutes of cardiovascular activity per week, maintaining a normal BMI, a diet low in saturated fat, a daily baby aspirin, vitamin d supplementation and limiting alcohol to less than 3-4 drinks per week.    2.   Anxiety:  We spent quite some time today discussing her symptoms and potential treatments.  She declines antidepressants or anxiolytics, favoring exercise instead.  She declines referral to psychology at this time.  She is wondering if it may be better if she receives care at another facility, but is uncertain as to whether she would like to transfer care.  I asked that she let me know what I can do to help her get through her breast cancer surveillance visits such as referral to psychology, anxiolytics, vs aiding her in transferring care to another facility.  She will continue regular exercise for now.    3.  Difficulty losing weight:  She reports difficulty losing weight despite healthy diet and regular exercise in the past year.  We discussed slow decline in metabolism starting at age 34 yo and effects of stress on weight.  She declines referral to a nutritionist or weight management program at this time.  I recommended she add light weights to her exercise routine and continue to eat a diet low in saturated fat and refined sugar.    4.  Follow Up:  Return in 6 months for breast MRI and visit with me.    It was a pleasure to see April in clinic today.  A total of 25 minutes of our 30 minute face to face visit was spent in counseling.      Again, thank you for allowing me to participate in the care of your patient.      Sincerely,    Valerie Delgado MD

## 2017-06-05 NOTE — MR AVS SNAPSHOT
"              After Visit Summary   6/5/2017 April Cindy Shen    MRN: [id number]           Patient Information     Date Of Birth          1980        Visit Information        Provider Department      6/5/2017 2:15 PM Valerie Delgado MD [facility]        Today's Diagnoses     Ductal carcinoma in situ (DCIS) of right breast    -  1    Breast cancer screening, high risk patient        Dense breasts           Follow-ups after your visit        Who to contact     If you have questions or need follow up information about today's clinic visit or your schedule please contact [facility] directly at [phone].  Normal or non-critical lab and imaging results will be communicated to you by itsDapperhart, letter or phone within 4 business days after the clinic has received the results. If you do not hear from us within 7 days, please contact the clinic through itsDapperhart or phone. If you have a critical or abnormal lab result, we will notify you by phone as soon as possible.  Submit refill requests through Tunespeak or call your pharmacy and they will forward the refill request to us. Please allow 3 business days for your refill to be completed.          Additional Information About Your Visit        MyChart Information     Tunespeak gives you secure access to your electronic health record. If you see a primary care provider, you can also send messages to your care team and make appointments. If you have questions, please call your primary care clinic.  If you do not have a primary care provider, please call [phone] and they will assist you.        Care EveryWhere ID     This is your Care EveryWhere ID. This could be used by other organizations to access your [facility] medical records  [id number]        Your Vitals Were     Pulse Temperature Respirations Height Pulse Oximetry BMI (Body Mass Index)    56 97.9  F (36.6  C) (Oral) 16 1.66 m (5' 5.35\") 96% 29.02 kg/m2       " Blood Pressure from Last 3 Encounters:   06/05/17 100/60   11/22/16 118/67   10/31/16 113/64    Weight from Last 3 Encounters:   06/05/17 80 kg (176 lb 4.8 oz)   12/22/16 80.7 kg (178 lb)   12/14/16 75.8 kg (167 lb)                 Today's Medication Changes          These changes are accurate as of: 6/5/17 11:59 PM.  If you have any questions, ask your nurse or doctor.               Stop taking these medicines if you haven't already. Please contact your care team if you have questions.     hydrocortisone 2.5 % cream   Stopped by:  Valerie Delgado MD           LORazepam 0.25 mg Tabs half-tab   Commonly known as:  ATIVAN   Stopped by:  Valerie Delgado MD           LORazepam 0.5 MG tablet   Commonly known as:  ATIVAN   Stopped by:  Valerie Delgado MD                    Primary Care Provider Office Phone # Fax #    Bita Perez [phone]       [place] [address]        Thank you!     Thank you for choosing [facility]  for your care. Our goal is always to provide you with excellent care. Hearing back from our patients is one way we can continue to improve our services. Please take a few minutes to complete the written survey that you may receive in the mail after your visit with us. Thank you!             Your Updated Medication List - Protect others around you: Learn how to safely use, store and throw away your medicines at www.disposemymeds.org.          This list is accurate as of: 6/5/17 11:59 PM.  Always use your most recent med list.                   Brand Name Dispense Instructions for use    bromocriptine 2.5 MG tablet    PARLODEL    180 tablet    Take 1 tablet (2.5 mg) by mouth 2 times daily

## 2017-08-30 ENCOUNTER — TELEPHONE (OUTPATIENT)
Dept: ENDOCRINOLOGY | Facility: CLINIC | Age: 37
End: 2017-08-30

## 2017-08-30 NOTE — TELEPHONE ENCOUNTER
April was notified that Dr Farooq  wants to see her in clinic first then go from her assessment on which labs to order.

## 2017-08-30 NOTE — TELEPHONE ENCOUNTER
----- Message from Maude Farooq MD sent at 8/29/2017  6:56 PM CDT -----  Regarding: RE: call back, questions about orders, pt of Dr. Farooq  Contact: [phone]  I have seen her once before so the comment that I usually do labs before is impossible.  I recommend she see me first and we will determine what, if anything, is needed.    Maude Farooq      ----- Message -----     From: Nieves Govea RN     Sent: 8/29/2017  12:29 PM       To: Maude Farooq MD  Subject: FW: call back, questions about orders, pt of#    Coming 9/5/17 for annual. She is questioning if you want  any labs done ahead. She  has been  experiencing fatigue, weight gain,  neck fullness in front , memory issues, depression, emotional - lyle  and an overwhelming need to eat.... No control. Question if her thyroid needs  checking . She did have an  Aunt that she is  a lot alike that  had thyroid issues.  . NO PCP so  Labs have been done  for these symptoms. Last prolactin done   6/2017 Do you want to  Set up labs  With her knowing if something comes  Up at visit she will need to get poked again.?   ----- Message -----     From: China Phillips     Sent: 8/29/2017  10:49 AM       To: [facility] Triage-  Subject: call back, questions about orders, pt of DrEl#    Pt requesting call back to discuss some orders. Pt is coming in for a f/u appt with Dr. Farooq on 9/5/17 and stated she usually has labs and a CT scan prior. Pt is not sure if she should have it done that way again or not? Pt also wanted to speak to a nurse regarding some symptoms she has been having that she thinks may be linked to her thyroid. Pt can be reached at [phone]. Thanks.      Call Center    Please DO NOT send this message and/or reply back to sender.  Call Center Representatives DO NOT respond to messages.

## 2017-08-31 ASSESSMENT — ENCOUNTER SYMPTOMS
JOINT SWELLING: 1
HYPERTENSION: 0
POOR WOUND HEALING: 0
DIZZINESS: 0
ORTHOPNEA: 1
PANIC: 1
SEIZURES: 0
SKIN CHANGES: 1
DEPRESSION: 1
BREAST PAIN: 1
MUSCLE WEAKNESS: 1
WEAKNESS: 1
BOWEL INCONTINENCE: 1
NIGHT SWEATS: 1
WEIGHT GAIN: 1
TROUBLE SWALLOWING: 0
RECTAL BLEEDING: 0
MYALGIAS: 1
LEG SWELLING: 1
NAIL CHANGES: 0
EXERCISE INTOLERANCE: 1
TREMORS: 0
NECK MASS: 0
CLAUDICATION: 1
HEADACHES: 1
HOARSE VOICE: 1
PALPITATIONS: 0
DECREASED CONCENTRATION: 1
INSOMNIA: 1
POLYDIPSIA: 0
DECREASED LIBIDO: 1
DIARRHEA: 0
SLEEP DISTURBANCES DUE TO BREATHING: 0
HOT FLASHES: 0
DECREASED APPETITE: 0
LOSS OF CONSCIOUSNESS: 1
DISTURBANCES IN COORDINATION: 0
FEVER: 0
POLYPHAGIA: 1
INCREASED ENERGY: 1
FATIGUE: 1
ABDOMINAL PAIN: 0
LEG PAIN: 0
CHILLS: 0
BREAST MASS: 0
MUSCLE CRAMPS: 1
HEARTBURN: 0
HYPOTENSION: 0
TINGLING: 0
LIGHT-HEADEDNESS: 0
BLOOD IN STOOL: 0
VOMITING: 0
NUMBNESS: 0
MEMORY LOSS: 1
SMELL DISTURBANCE: 0
WEIGHT LOSS: 0
SORE THROAT: 0
STIFFNESS: 1
SINUS PAIN: 0
NECK PAIN: 0
SYNCOPE: 1
BACK PAIN: 0
NERVOUS/ANXIOUS: 1
RECTAL PAIN: 0
ALTERED TEMPERATURE REGULATION: 0
JAUNDICE: 0
HALLUCINATIONS: 0
SPEECH CHANGE: 1
NAUSEA: 0
PARALYSIS: 0
TASTE DISTURBANCE: 0
CONSTIPATION: 1
TACHYCARDIA: 0
BLOATING: 1
SINUS CONGESTION: 0
ARTHRALGIAS: 1

## 2017-09-05 ENCOUNTER — OFFICE VISIT (OUTPATIENT)
Dept: ENDOCRINOLOGY | Facility: CLINIC | Age: 37
End: 2017-09-05

## 2017-09-05 VITALS
SYSTOLIC BLOOD PRESSURE: 106 MMHG | HEIGHT: 65 IN | DIASTOLIC BLOOD PRESSURE: 71 MMHG | HEART RATE: 61 BPM | WEIGHT: 179.3 LBS | BODY MASS INDEX: 29.87 KG/M2

## 2017-09-05 DIAGNOSIS — R41.3 MEMORY LOSS: ICD-10-CM

## 2017-09-05 DIAGNOSIS — D49.7 PITUITARY TUMOR: ICD-10-CM

## 2017-09-05 DIAGNOSIS — E22.1 HYPERPROLACTINEMIA (H): ICD-10-CM

## 2017-09-05 DIAGNOSIS — D49.7 PITUITARY TUMOR: Primary | ICD-10-CM

## 2017-09-05 LAB
CALCIUM SERPL-MCNC: 9.2 MG/DL (ref 8.5–10.1)
CORTIS SERPL-MCNC: 5.7 UG/DL (ref 4–22)
PHOSPHATE SERPL-MCNC: 3.3 MG/DL (ref 2.5–4.5)
PROLACTIN SERPL-MCNC: 15 UG/L (ref 3–27)
T4 FREE SERPL-MCNC: 1.19 NG/DL (ref 0.76–1.46)
TSH SERPL DL<=0.005 MIU/L-ACNC: 2.29 MU/L (ref 0.4–4)

## 2017-09-05 ASSESSMENT — PAIN SCALES - GENERAL: PAINLEVEL: NO PAIN (0)

## 2017-09-05 NOTE — PROGRESS NOTES
Endocrine Consult note    Attending Assessment/Plan :     1.  Prolactinoma by history. She is on bromocriptine 2.5 mg bid or less.  Labs today. Discussed option to change to cabergoline which might be easier to remember (2 times/week)    2. PItuitary tumor - < 1 cm . The size was ? slighlty larger on the 9/16 images compared with earlier comparison .     3.  Neuropsych symptoms - refer back to PCP.  She had appt in [facility] 9/12/17    4.  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 I have seen her once previously 9/16.  That day, she admitted to noncompliance with dopamine agonist therapy.  Prolactin was 71.  I recommended she commit to compliance and repeat labs in one month, which she didn't do until 6/5/17 when the prolactin was 23.     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  We do not have primary lab data related to this diagnosis.  She has been on bromocriptine 2.5 mg bid since then .   With the treatment her periods became regular.     Following the appt I got a small packet of data from [facility] which included lab reports we didn't yet have:  12/2/09: TSH 1.06  12/3/09: prolactin 82  12/8/09: prolactin 88  8/10/10: prolactin 80.6  12/29/10: prolactin 10  8/17/11: TSH 1.35  5/22/12: prolactin 7.9  9/12/13: prolactin 19.9    Imaging reports  1/6/10 pituitary MRI: 6.1 mm left pituitary mass - I have reviewed the images and I agree with this report.  8/17/11 pituitary MRI - stable T1 isointense , t2 hyperintense mass left pituitary - 6 x 3 mm.    12/19/12 pituitary MRI: 5 x 3 mm left pituitary mass; read as no change - (to my eye I thought it looked smaller on the 2012 images)    I have reviwed images on PACS  9/23/16 pituitary MRI:  "left pituitary mass 4 x 7 mm ; stalk slightly deviated to the right.    She had XRT to breast, ending January, 2017.      REVIEW OF SYSTEMS  Weight gain from 155-154 Jan 2016.  Sudden more significant increase   Terrible sleep- hard to fall asleep, anxiety at night- \"fear of being 37\"; wakes up and can't get back to sleep well  Wakes up with sore sinus / nasal cavity  A lot of sweating at night  doesn't know if she snores  If she sleeps on side it affects her breathing  If she sleeps on back she feels breasts are too heavy  Cardiac: occasional palpitations every now and again - lifelong  GI: don't feel regular; she has lost control of bowels x 3 since March - once coming home from the gym; this followed ingestion of sample of coconut protein and coffee; She also had this happen while on dog walk - she has discussed with her doctor at her annual exam   Shooting deep internal dull stabbing pain in vagina or rectum - stops you from walking   Issues with brain, forgetting  Stress  Replacement IUD last week at [facility] - Dr Mosley -   Mentally unwell -  Concern brain can't recognize stuff  No periods on IUD  Foot swelling at the end of the day.     Past Medical History  Past Medical History:   Diagnosis Date     Anxiety      Breast CA (H)      Depression      Intraductal papilloma of breast 2015    right; biopsy proven 11/25/15     Peptic ulcer disease      Pituitary microadenoma (H) 2010    6 mm      Prolactinoma (H)     6 mm pituitary mass      Past Surgical History:   Procedure Laterality Date     LUMPECTOMY BREAST Right 9/28/2016    Procedure: LUMPECTOMY BREAST;  Surgeon: Andreia Dorantes MD;  Location:  OR     wisdom teeth extraction       Medications    Current Outpatient Prescriptions   Medication Sig Dispense Refill     bromocriptine (PARLODEL) 2.5 MG tablet Take 1 tablet (2.5 mg) by mouth 2 times daily 180 tablet 3     Has pill tray but has not been using it lately    Allergies  Allergies   Allergen Reactions " "    Fruit & Vegetable Hives     Mangos only and raw nuts     Family History  family history includes CANCER in her maternal grandmother; DIABETES in her father and paternal aunt; Obesity in her paternal aunt; Sleep Apnea in her father and paternal grandmother. There is no history of Pituitary Disorder or Nephrolithiasis.     Social History  Social History   Substance Use Topics     Smoking status: Passive Smoke Exposure - Never Smoker     Smokeless tobacco: Not on file     Alcohol use 0.0 oz/week     0 Standard drinks or equivalent per week      Comment: 2/week     Kind of using pill tray but not well; needs PCP at a  network location    Physical Exam  /71  Pulse 61  Ht 1.651 m (5' 5\")  Wt 81.3 kg (179 lb 4.8 oz)  BMI 29.84 kg/m2  Body mass index is 29.84 kg/(m^2).  GENERAL : pleasant young woman  In no apparent distress. Verge of tears intermittently  SKIN: Normal color, normal temperature, texture.  No hirsutism, alopecia  Or purple striae   EYES: PER, No scleral icterus,  No proptosis, conjunctival redness, stare, retraction  NECK: No visible masses. No palpable adenopathy, or masses. No carotid bruits.   THYROID:  Not palpable   RESP: Lungs clear to auscultation bilaterally  CARDIAC: Regular rate and rhythm, normal S1 S2, without murmurs, rubs or gallops        NEURO: awake, alert, responds appropriately to questions.  Moves all extremities; Gait normal.  No tremor of the outstretched hand.  DTRs  2 /4 ,   EXTREMITIES: No clubbing, cyanosis or edema.    DATA REVIEW    Results for JEANETTE, APRIL RAYMUNDO (MRN [id number]) as of 9/7/2017 20:20   Ref. Range 6/5/2017 15:36 9/5/2017 15:31 9/5/2017 15:32   Calcium Latest Ref Range: 8.5 - 10.1 mg/dL  9.2    Phosphorus Latest Ref Range: 2.5 - 4.5 mg/dL  3.3    Cortisol Serum Latest Ref Range: 4 - 22 ug/dL   5.7   Prolactin Latest Ref Range: 3 - 27 ug/L 23 15    T4 Free Latest Ref Range: 0.76 - 1.46 ng/dL  1.19    TSH Latest Ref Range: 0.40 - 4.00 mU/L  2.29    Ins " Growth Factor 1 Latest Ref Range: 80 - 277 ng/ml  162      Patient Name: DE REID   MR#: [id number]   Specimen #: [id number]   Collected: 11/25/2015   Received: 11/25/2015   Reported: 11/27/2015 11:27   Ordering Phy(s): QUIQUE KILPATRICK     SPECIMEN(S):   Breast needle biopsy, right, 8:30 position     FINAL DIAGNOSIS:   Breast, right, 8:30, 5 cm from nipple, ultrasound guided core biopsy:   - Fragments of intraductal papilloma with apocrine metaplasia and   stromal sclerosis   - Fibrocystic change (including microcysts with apocrine metaplasia)   - No atypical or malignant findings     I have personally reviewed all specimens and or slides, including the   listed special stains, and used them with my medical judgement to   determine the final diagnosis.     Electronically signed out by:     Nyla Mishra M.D., [facility]

## 2017-09-05 NOTE — LETTER
9/5/2017       RE: Soraida Shen  [address]  [facility] [address]     Dear Colleague,    Thank you for referring your patient, Soraida Shen, to the [facility] ENDOCRINOLOGY at [facility]. Please see a copy of my visit note below.    Endocrine Consult note    Attending Assessment/Plan :     1.  Prolactinoma by history. She is on bromocriptine 2.5 mg bid or less.  Labs today. Discussed option to change to cabergoline which might be easier to remember (2 times/week)    2. PItuitary tumor - < 1 cm . The size was ? slighlty larger on the 9/16 images compared with earlier comparison .     3.  Neuropsych symptoms - refer back to PCP.  She had appt in [facility] 9/12/17    4.  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 I have seen her once previously 9/16.  That day, she admitted to noncompliance with dopamine agonist therapy.  Prolactin was 71.  I recommended she commit to compliance and repeat labs in one month, which she didn't do until 6/5/17 when the prolactin was 23.     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  We do not have primary lab data related to this diagnosis.  She has been on bromocriptine 2.5 mg bid since then .   With the treatment her periods became regular.     Following the appt I got a small packet of data from [facility] which included lab reports we didn't yet have:  12/2/09: TSH 1.06  12/3/09: prolactin 82  12/8/09: prolactin 88  8/10/10: prolactin 80.6  12/29/10: prolactin 10  8/17/11: TSH 1.35  5/22/12: prolactin 7.9  9/12/13: prolactin 19.9    Imaging reports  1/6/10 pituitary MRI: 6.1 mm left pituitary mass - I have reviewed the images and I agree with this  "report.  8/17/11 pituitary MRI - stable T1 isointense , t2 hyperintense mass left pituitary - 6 x 3 mm.    12/19/12 pituitary MRI: 5 x 3 mm left pituitary mass; read as no change - (to my eye I thought it looked smaller on the 2012 images)    I have reviwed images on PACS  9/23/16 pituitary MRI: left pituitary mass 4 x 7 mm ; stalk slightly deviated to the right.    She had XRT to breast, ending January, 2017.      REVIEW OF SYSTEMS  Weight gain from 155-154 Jan 2016.  Sudden more significant increase   Terrible sleep- hard to fall asleep, anxiety at night- \"fear of being 37\"; wakes up and can't get back to sleep well  Wakes up with sore sinus / nasal cavity  A lot of sweating at night  doesn't know if she snores  If she sleeps on side it affects her breathing  If she sleeps on back she feels breasts are too heavy  Cardiac: occasional palpitations every now and again - lifelong  GI: don't feel regular; she has lost control of bowels x 3 since March - once coming home from the gym; this followed ingestion of sample of coconut protein and coffee; She also had this happen while on dog walk - she has discussed with her doctor at her annual exam   Shooting deep internal dull stabbing pain in vagina or rectum - stops you from walking   Issues with brain, forgetting  Stress  Replacement IUD last week at [facility] - Dr Mosley -   Mentally unwell -  Concern brain can't recognize stuff  No periods on IUD  Foot swelling at the end of the day.     Past Medical History  Past Medical History:   Diagnosis Date     Anxiety      Breast CA (H)      Depression      Intraductal papilloma of breast 2015    right; biopsy proven 11/25/15     Peptic ulcer disease      Pituitary microadenoma (H) 2010    6 mm      Prolactinoma (H)     6 mm pituitary mass      Past Surgical History:   Procedure Laterality Date     LUMPECTOMY BREAST Right 9/28/2016    Procedure: LUMPECTOMY BREAST;  Surgeon: Andreia Dorantes MD;  Location: [facility] OR     wisdom teeth " "extraction       Medications    Current Outpatient Prescriptions   Medication Sig Dispense Refill     bromocriptine (PARLODEL) 2.5 MG tablet Take 1 tablet (2.5 mg) by mouth 2 times daily 180 tablet 3     Has pill tray but has not been using it lately    Allergies  Allergies   Allergen Reactions     Fruit & Vegetable Hives     Mangos only and raw nuts     Family History  family history includes CANCER in her maternal grandmother; DIABETES in her father and paternal aunt; Obesity in her paternal aunt; Sleep Apnea in her father and paternal grandmother. There is no history of Pituitary Disorder or Nephrolithiasis.     Social History  Social History   Substance Use Topics     Smoking status: Passive Smoke Exposure - Never Smoker     Smokeless tobacco: Not on file     Alcohol use 0.0 oz/week     0 Standard drinks or equivalent per week      Comment: 2/week     Kind of using pill tray but not well; needs PCP at a  network location    Physical Exam  /71  Pulse 61  Ht 1.651 m (5' 5\")  Wt 81.3 kg (179 lb 4.8 oz)  BMI 29.84 kg/m2  Body mass index is 29.84 kg/(m^2).  GENERAL : pleasant young woman  In no apparent distress. Verge of tears intermittently  SKIN: Normal color, normal temperature, texture.  No hirsutism, alopecia  Or purple striae   EYES: PER, No scleral icterus,  No proptosis, conjunctival redness, stare, retraction  NECK: No visible masses. No palpable adenopathy, or masses. No carotid bruits.   THYROID:  Not palpable   RESP: Lungs clear to auscultation bilaterally  CARDIAC: Regular rate and rhythm, normal S1 S2, without murmurs, rubs or gallops        NEURO: awake, alert, responds appropriately to questions.  Moves all extremities; Gait normal.  No tremor of the outstretched hand.  DTRs  2 /4 ,   EXTREMITIES: No clubbing, cyanosis or edema.    DATA REVIEW    Results for JEANETTE, APRIL RAYMUNDO (MRN [id number]) as of 9/7/2017 20:20   Ref. Range 6/5/2017 15:36 9/5/2017 15:31 9/5/2017 15:32   Calcium Latest " Ref Range: 8.5 - 10.1 mg/dL  9.2    Phosphorus Latest Ref Range: 2.5 - 4.5 mg/dL  3.3    Cortisol Serum Latest Ref Range: 4 - 22 ug/dL   5.7   Prolactin Latest Ref Range: 3 - 27 ug/L 23 15    T4 Free Latest Ref Range: 0.76 - 1.46 ng/dL  1.19    TSH Latest Ref Range: 0.40 - 4.00 mU/L  2.29    Ins Growth Factor 1 Latest Ref Range: 80 - 277 ng/ml  162      Patient Name: DE REID   MR#: [id number]   Specimen #: [id number]   Collected: 11/25/2015   Received: 11/25/2015   Reported: 11/27/2015 11:27   Ordering Phy(s): QUIQUE KILPATRICK     SPECIMEN(S):   Breast needle biopsy, right, 8:30 position     FINAL DIAGNOSIS:   Breast, right, 8:30, 5 cm from nipple, ultrasound guided core biopsy:   - Fragments of intraductal papilloma with apocrine metaplasia and   stromal sclerosis   - Fibrocystic change (including microcysts with apocrine metaplasia)   - No atypical or malignant findings     I have personally reviewed all specimens and or slides, including the   listed special stains, and used them with my medical judgement to   determine the final diagnosis.     Electronically signed out by:     Nyla Mishra M.D., [facility]

## 2017-09-05 NOTE — NURSING NOTE
Chief Complaint   Patient presents with     RECHECK     MICROADENOMA ON PITUITARY F/U      Vivienne Law CMA

## 2017-09-05 NOTE — MR AVS SNAPSHOT
After Visit Summary   9/5/2017 April Cindy Shen    MRN: [id number]           Patient Information     Date Of Birth          1980        Visit Information        Provider Department      9/5/2017 2:00 PM Maude Farooq MD [facility] Endocrinology        Today's Diagnoses     Pituitary tumor    -  1    Hyperprolactinemia (H)        Memory loss           Follow-ups after your visit        Your next 10 appointments already scheduled     Sep 12, 2017  7:00 AM CDT   (Arrive by 6:45 AM)   MR BRAIN W/O & W CONTRAST with [id number]   [facility] [place] [facility] ([facility])    [address] [address] [phone]           Take your medicines as usual, unless your doctor tells you not to. Bring a list of your current medicines to your exam (including vitamins, minerals and over-the-counter drugs).  You will be given intravenous contrast for this exam. To prepare:   The day before your exam, drink extra fluids at least six 8-ounce glasses (unless your doctor tells you to restrict your fluids).   Have a blood test (creatinine test) within 30 days of your exam. Go to your clinic or Diagnostic Imaging Department for this test.  The MRI machine uses a strong magnet. Please wear clothes without metal (snaps, zippers). A sweatsuit works well, or we may give you a hospital gown.  Please remove any body piercings and hair extensions before you arrive. You will also remove watches, jewelry, hairpins, wallets, dentures, partial dental plates and hearing aids. You may wear contact lenses, and you may be able to wear your rings. We have a safe place to keep your personal items, but it is safer to leave them at home.   **IMPORTANT** THE INSTRUCTIONS BELOW ARE ONLY FOR THOSE PATIENTS WHO HAVE BEEN TOLD THEY WILL RECEIVE SEDATION OR GENERAL ANESTHESIA DURING THEIR MRI PROCEDURE:  IF YOU WILL RECEIVE SEDATION (take medicine to help you relax during your exam):    You must get the medicine from your doctor before you arrive. Bring the medicine to the exam. Do not take it at home.   Arrive one hour early. Bring someone who can take you home after the test. Your medicine will make you sleepy. After the exam, you may not drive, take a bus or take a taxi by yourself.   No eating 8 hours before your exam. You may have clear liquids up until 4 hours before your exam. (Clear liquids include water, clear tea, black coffee and fruit juice without pulp.)  IF YOU WILL RECEIVE ANESTHESIA (be asleep for your exam):   Arrive 1 1/2 hours early. Bring someone who can take you home after the test. You may not drive, take a bus or take a taxi by yourself.   No eating 8 hours before your exam. You may have clear liquids up until 4 hours before your exam. (Clear liquids include water, clear tea, black coffee and fruit juice without pulp.)  Please call the Imaging Department at your exam site with any questions.            Sep 12, 2017 10:10 AM CDT   (Arrive by 9:55 AM)   New Patient Visit with Karen Conway MD   [facility] ([facility] [place])    [address]  4th Floor  [place] [place] [address] [phone]            Dec 08, 2017  3:15 PM CST   (Arrive by 3:00 PM)   MR BREAST BILATERAL W/O & W CONTRAST with [address] [facility] [place] [facility] ([facility])    [address]  1st Floor  [facility] [place] [address] [phone]           Take your medicines as usual, unless your doctor tells you not to. Bring a list of your current medicines to your exam (including vitamins, minerals and over-the-counter drugs).  The timing of your exam may depend on the start of your last period. If you re in menopause, you may have the exam anytime.  Please bring any previous mammograms or breast MRIs from other facilities to the MRI dept. Do not mail these items to us.  You will have contrast for this exam. To prepare:   The  day before your exam, drink extra fluids at least six 8-ounce glasses (unless your doctor tells you to restrict your fluids).   Have a blood test (creatinine test) within 30 days of your exam. Go to your clinic or Diagnostic Imaging Department for this test.  The MRI machine uses a strong magnet. Please wear clothes without metal (snaps, zippers). A sweatsuit works well, or we may give you a hospital gown.  Please remove any body piercings and hair extensions before you arrive. You will also remove watches, jewelry, hairpins, wallets, dentures, partial dental plates and hearing aids. You may wear contact lenses, and you may be able to wear your rings. We have a safe place to keep your personal items, but it is safer to leave them at home.   **IMPORTANT** THE INSTRUCTIONS BELOW ARE ONLY FOR THOSE PATIENTS WHO HAVE BEEN TOLD THEY WILL RECEIVE SEDATION OR GENERAL ANESTHESIA DURING THEIR MRI PROCEDURE:  IF YOU WILL RECEIVE SEDATION (take medicine to help you relax during your exam)   You must get the medicine from your doctor before you arrive. Bring the medicine to the exam. Do not take it at home.   Arrive one hour early. Bring someone who can take you home after the test. Your medicine will make you sleepy. After the exam, you may not drive, take a bus or take a taxi by yourself.   No eating 8 hours before your exam. You may have clear liquids up until 4 hours before your exam. (Clear liquids include water, clear tea, black coffee and fruit juice without pulp.)  IF YOU WILL RECEIVE ANESTHESIA (be asleep for your exam)   Arrive 1 1/2 hours early. Bring someone who can take you home after the test. You may not drive, take a bus or take a taxi by yourself.   No eating 8 hours before your exam. You may have clear liquids up until 4 hours before your exam. (Clear liquids include water, clear tea, black coffee and fruit juice without pulp.)  If you have any questions, please contact your Imaging Department exam site.        "     Dec 11, 2017  1:45 PM CST   (Arrive by 1:30 PM)   Return Visit with Valerie Delgado MD   [facility] ([facility] [place])    [address] [address] [phone]              Who to contact     Please call your clinic at [phone] to:    Ask questions about your health    Make or cancel appointments    Discuss your medicines    Learn about your test results    Speak to your doctor   If you have compliments or concerns about an experience at your clinic, or if you wish to file a complaint, please contact [facility] Patient Relations at [phone] or email us at [email or web address]         Additional Information About Your Visit        [employer] - [employer] Information     [employer] gives you secure access to your electronic health record. If you see a primary care provider, you can also send messages to your care team and make appointments. If you have questions, please call your primary care clinic.  If you do not have a primary care provider, please call [phone] and they will assist you.      [employer] is an electronic gateway that provides easy, online access to your medical records. With [employer], you can request a clinic appointment, read your test results, renew a prescription or communicate with your care team.     To access your existing account, please contact your [facility] Clinic or call [phone] for assistance.        Care EveryWhere ID     This is your Care EveryWhere ID. This could be used by other organizations to access your [place] medical records  [id number]        Your Vitals Were     Pulse Height BMI (Body Mass Index)             61 1.651 m (5' 5\") 29.84 kg/m2          Blood Pressure from Last 3 Encounters:   09/05/17 106/71   06/05/17 100/60   11/22/16 118/67    Weight from Last 3 Encounters:   09/05/17 81.3 kg (179 lb 4.8 oz)   06/05/17 80 kg " (176 lb 4.8 oz)   12/22/16 80.7 kg (178 lb)               Primary Care Provider Office Phone # Fax #    Bita Perez [phone]       [address] [address]        Equal Access to Services     FELECIA AGUILAR : Hadii ofelia zapien hadasho Soomaali, waaxda luqadaha, qaybta kaalmada adeegyada, waxay oli haykrystyna riveraladanjareth tam. So [facility] [phone].    ATENCIÓN: Si habla español, tiene a alfred disposición servicios gratuitos de asistencia lingüística. Llame al [phone].    We comply with applicable federal civil rights laws and [place] laws. We do not discriminate on the basis of race, color, national origin, age, disability sex, sexual orientation or gender identity.            Thank you!     Thank you for choosing [facility] ENDOCRINOLOGY  for your care. Our goal is always to provide you with excellent care. Hearing back from our patients is one way we can continue to improve our services. Please take a few minutes to complete the written survey that you may receive in the mail after your visit with us. Thank you!             Your Updated Medication List - Protect others around you: Learn how to safely use, store and throw away your medicines at www.disposemymeds.org.          This list is accurate as of: 9/5/17 11:59 PM.  Always use your most recent med list.                   Brand Name Dispense Instructions for use Diagnosis    bromocriptine 2.5 MG tablet    PARLODEL    180 tablet    Take 1 tablet (2.5 mg) by mouth 2 times daily    Prolactinoma (H), Pituitary tumor

## 2017-09-07 LAB — IGF-I BLD-MCNC: 162 NG/ML (ref 80–277)

## 2017-09-08 DIAGNOSIS — D49.7 PITUITARY TUMOR: Primary | ICD-10-CM

## 2017-09-08 RX ORDER — CABERGOLINE 0.5 MG/1
0.25 TABLET ORAL
Qty: 8 TABLET | Refills: 3 | Status: SHIPPED | OUTPATIENT
Start: 2017-09-11 | End: 2017-09-13

## 2017-09-11 ASSESSMENT — ENCOUNTER SYMPTOMS
MEMORY LOSS: 1
TREMORS: 0
LOSS OF CONSCIOUSNESS: 1
DECREASED APPETITE: 0
NUMBNESS: 0
PALPITATIONS: 0
SWOLLEN GLANDS: 0
SINUS PAIN: 0
HYPERTENSION: 0
DECREASED LIBIDO: 1
FLANK PAIN: 1
LEG SWELLING: 1
DYSURIA: 0
DECREASED CONCENTRATION: 1
RECTAL BLEEDING: 0
MUSCLE WEAKNESS: 1
DYSPNEA ON EXERTION: 1
NAUSEA: 0
STIFFNESS: 1
FEVER: 0
NAIL CHANGES: 0
TACHYCARDIA: 0
VOMITING: 0
DIFFICULTY URINATING: 0
NERVOUS/ANXIOUS: 1
BRUISES/BLEEDS EASILY: 1
FATIGUE: 1
NIGHT SWEATS: 1
JAUNDICE: 0
MUSCLE CRAMPS: 1
DIARRHEA: 0
ALTERED TEMPERATURE REGULATION: 0
WHEEZING: 0
ABDOMINAL PAIN: 1
JOINT SWELLING: 1
INSOMNIA: 1
HEMATURIA: 0
SORE THROAT: 0
HYPOTENSION: 0
SYNCOPE: 1
HEADACHES: 1
SINUS CONGESTION: 0
NECK MASS: 0
CONSTIPATION: 1
ARTHRALGIAS: 1
COUGH: 0
BACK PAIN: 0
DEPRESSION: 1
PARALYSIS: 0
CLAUDICATION: 1
LIGHT-HEADEDNESS: 1
POLYDIPSIA: 0
BLOOD IN STOOL: 0
SNORES LOUDLY: 1
TINGLING: 0
SEIZURES: 0
BOWEL INCONTINENCE: 1
WEIGHT LOSS: 0
PANIC: 1
HOT FLASHES: 0
RESPIRATORY PAIN: 0
ORTHOPNEA: 1
BLOATING: 1
SPEECH CHANGE: 1
SLEEP DISTURBANCES DUE TO BREATHING: 0
SPUTUM PRODUCTION: 0
DISTURBANCES IN COORDINATION: 1
EXERCISE INTOLERANCE: 1
COUGH DISTURBING SLEEP: 0
POOR WOUND HEALING: 0
NECK PAIN: 1
LEG PAIN: 1
SMELL DISTURBANCE: 0
HEARTBURN: 1
HOARSE VOICE: 1
SKIN CHANGES: 1
TROUBLE SWALLOWING: 0
POLYPHAGIA: 1
INCREASED ENERGY: 1
MYALGIAS: 1
POSTURAL DYSPNEA: 1
HALLUCINATIONS: 0
DIZZINESS: 0
CHILLS: 0
WEIGHT GAIN: 1
RECTAL PAIN: 1
WEAKNESS: 1

## 2017-09-12 ENCOUNTER — OFFICE VISIT (OUTPATIENT)
Dept: INTERNAL MEDICINE | Facility: CLINIC | Age: 37
End: 2017-09-12

## 2017-09-12 VITALS
SYSTOLIC BLOOD PRESSURE: 109 MMHG | HEART RATE: 71 BPM | WEIGHT: 179.2 LBS | HEIGHT: 65 IN | DIASTOLIC BLOOD PRESSURE: 68 MMHG | BODY MASS INDEX: 29.85 KG/M2

## 2017-09-12 DIAGNOSIS — Z76.89 ENCOUNTER TO ESTABLISH CARE: ICD-10-CM

## 2017-09-12 DIAGNOSIS — F43.21 ADJUSTMENT DISORDER WITH DEPRESSED MOOD: Primary | ICD-10-CM

## 2017-09-12 DIAGNOSIS — F40.243 FEAR OF FLYING: ICD-10-CM

## 2017-09-12 RX ORDER — BUPROPION HYDROCHLORIDE 150 MG/1
150 TABLET, EXTENDED RELEASE ORAL 2 TIMES DAILY
Qty: 60 TABLET | Refills: 1 | Status: SHIPPED | OUTPATIENT
Start: 2017-09-12 | End: 2017-09-14

## 2017-09-12 RX ORDER — LORAZEPAM 1 MG/1
1 TABLET ORAL
COMMUNITY
Start: 2014-08-28 | End: 2017-09-13

## 2017-09-12 RX ORDER — LORAZEPAM 1 MG/1
1 TABLET ORAL EVERY 8 HOURS PRN
Qty: 5 TABLET | Refills: 0 | Status: SHIPPED | OUTPATIENT
Start: 2017-09-12 | End: 2021-06-20

## 2017-09-12 ASSESSMENT — PAIN SCALES - GENERAL: PAINLEVEL: NO PAIN (0)

## 2017-09-12 NOTE — PROGRESS NOTES
Brain/ Pituitary MRI without and with contrast     History: Neoplasm of unspecified behavior of endocrine glands and  other parts of nervous system, Hyperprolactinemia, Other amnesia.  Neoplasm of unspecified behavior of endocrine glands and other parts  of nervous system, Hyperprolactinemia, Other amnesia     Comparison:  MRI 9/23/2016      Technique: Axial diffusion and FLAIR images of the whole brain  obtained without intravenous contrast. Sagittal T1 and T2-weighted,  coronal T2-weighted, coronal T1-weighted images with focus on the  sella were obtained without intravenous contrast. Post intravenous  contrast using gadolinium coronal and sagittal T1-weighted images were  obtained focused on the sella. Dynamic postcontrast coronal  T1-weighted images were also obtained.     Contrast: 7.5 mL intravenous Gadavist     Findings:    There is a hypoenhancing lesion in the left aspect of the pituitary  gland, with hypoenhancement on the static postcontrast images,  measuring approximately 5.3 x 3.6 mm, not significantly changed  compared to prior exam.  The pituitary stalk appears midline. The optic chiasm appears intact  and not displaced. The major cavernous carotid vascular flow-voids  appear patent.       FLAIR images through the whole brain are unremarkable, and demonstrate  no mass effect, midline shift, or significant enlargement of the  ventricles.         Impression: No significant change in size of the hypoenhancing lesion  in the left aspect of the pituitary gland, presumably microadenoma  compared to 9/23/2016.      I have personally reviewed the examination and initial interpretation  and I agree with the findings.     DANIEL GARCIA MD           I have reviewed the images and I agree with the radiologists interpretation  Maude Farooq MD

## 2017-09-12 NOTE — MR AVS SNAPSHOT
After Visit Summary   9/12/2017 April Cindy Shen    MRN: [id number]           Patient Information     Date Of Birth          1980        Visit Information        Provider Department      9/12/2017 10:10 AM Karen Conway MD [facility]        Today's Diagnoses     Adjustment disorder with depressed mood    -  1    Fear of flying          Care Instructions    Primary Care Center Medication Refill Request Information:  * Please contact your pharmacy regarding ANY request for medication refills.  ** [facility] Prescription Fax = [phone]  * Please allow 3 business days for routine medication refills.  * Please allow 5 business days for controlled substance medication refills.     Primary Care Center Test Result notification information:  *You will be notified with in 7-10 days of your appointment day regarding the results of your test.  If you are on MyChart you will be notified as soon as the provider has reviewed the results and signed off on them.    [facility] [phone]             Follow-ups after your visit        Follow-up notes from your care team     Return in about 6 weeks (around 10/24/2017).      Your next 10 appointments already scheduled     Dec 08, 2017  3:15 PM CST   (Arrive by 3:00 PM)   MR BREAST BILATERAL W/O & W CONTRAST with [address] [facility] ([facility])    [address] [place] [address] [phone]           Take your medicines as usual, unless your doctor tells you not to. Bring a list of your current medicines to your exam (including vitamins, minerals and over-the-counter drugs).  The timing of your exam may depend on the start of your last period. If you re in menopause, you may have the exam anytime.  Please bring any previous mammograms or breast MRIs from other facilities to the MRI dept. Do not mail these items to us.  You will have contrast for this exam. To  prepare:   The day before your exam, drink extra fluids at least six 8-ounce glasses (unless your doctor tells you to restrict your fluids).   Have a blood test (creatinine test) within 30 days of your exam. Go to your clinic or Diagnostic Imaging Department for this test.  The MRI machine uses a strong magnet. Please wear clothes without metal (snaps, zippers). A sweatsuit works well, or we may give you a hospital gown.  Please remove any body piercings and hair extensions before you arrive. You will also remove watches, jewelry, hairpins, wallets, dentures, partial dental plates and hearing aids. You may wear contact lenses, and you may be able to wear your rings. We have a safe place to keep your personal items, but it is safer to leave them at home.   **IMPORTANT** THE INSTRUCTIONS BELOW ARE ONLY FOR THOSE PATIENTS WHO HAVE BEEN TOLD THEY WILL RECEIVE SEDATION OR GENERAL ANESTHESIA DURING THEIR MRI PROCEDURE:  IF YOU WILL RECEIVE SEDATION (take medicine to help you relax during your exam)   You must get the medicine from your doctor before you arrive. Bring the medicine to the exam. Do not take it at home.   Arrive one hour early. Bring someone who can take you home after the test. Your medicine will make you sleepy. After the exam, you may not drive, take a bus or take a taxi by yourself.   No eating 8 hours before your exam. You may have clear liquids up until 4 hours before your exam. (Clear liquids include water, clear tea, black coffee and fruit juice without pulp.)  IF YOU WILL RECEIVE ANESTHESIA (be asleep for your exam)   Arrive 1 1/2 hours early. Bring someone who can take you home after the test. You may not drive, take a bus or take a taxi by yourself.   No eating 8 hours before your exam. You may have clear liquids up until 4 hours before your exam. (Clear liquids include water, clear tea, black coffee and fruit juice without pulp.)  If you have any questions, please contact your Imaging Department  "exam site.            Dec 11, 2017  1:45 PM CST   (Arrive by 1:30 PM)   Return Visit with Valerie Delgado MD   [facility] ([facility])    [address] [address] [phone]              Who to contact     Please call your clinic at [phone] to:    Ask questions about your health    Make or cancel appointments    Discuss your medicines    Learn about your test results    Speak to your doctor   If you have compliments or concerns about an experience at your clinic, or if you wish to file a complaint, please contact [facility] Patient Relations at [phone] or email us at [email or web address]         Additional Information About Your Visit        [employer] Information     [employer] gives you secure access to your electronic health record. If you see a primary care provider, you can also send messages to your care team and make appointments. If you have questions, please call your primary care clinic.  If you do not have a primary care provider, please call [phone] and they will assist you.      [employer] is an electronic gateway that provides easy, online access to your medical records. With [employer], you can request a clinic appointment, read your test results, renew a prescription or communicate with your care team.     To access your existing account, please contact your [facility] or call [phone] for assistance.        Care EveryWhere ID     This is your Care EveryWhere ID. This could be used by other organizations to access your [facility] medical records  [id number]        Your Vitals Were     Pulse Height Breastfeeding? BMI (Body Mass Index)          71 1.645 m (5' 4.76\") No 30.04 kg/m2         Blood Pressure from Last 3 Encounters:   09/12/17 109/68   09/05/17 106/71   06/05/17 100/60    Weight from Last 3 Encounters:   09/12/17 81.3 kg " (179 lb 3.2 oz)   09/05/17 81.3 kg (179 lb 4.8 oz)   06/05/17 80 kg (176 lb 4.8 oz)              Today, you had the following     No orders found for display         Today's Medication Changes          These changes are accurate as of: 9/12/17 11:59 AM.  If you have any questions, ask your nurse or doctor.               Start taking these medicines.        Dose/Directions    buPROPion 150 MG 12 hr tablet   Commonly known as:  WELLBUTRIN SR   Used for:  Adjustment disorder with depressed mood   Started by:  Karen Conway MD        Dose:  150 mg   Take 1 tablet (150 mg) by mouth 2 times daily   Quantity:  60 tablet   Refills:  1         These medicines have changed or have updated prescriptions.        Dose/Directions    * LORazepam 1 MG tablet   Commonly known as:  ATIVAN   This may have changed:  Another medication with the same name was added. Make sure you understand how and when to take each.   Changed by:  Karen Conway MD        Dose:  1 mg   Take 1 mg by mouth   Refills:  0       * LORazepam 1 MG tablet   Commonly known as:  ATIVAN   This may have changed:  You were already taking a medication with the same name, and this prescription was added. Make sure you understand how and when to take each.   Used for:  Fear of flying   Changed by:  Karen Conway MD        Dose:  1 mg   Take 1 tablet (1 mg) by mouth every 8 hours as needed for anxiety (for flying)   Quantity:  5 tablet   Refills:  0       * Notice:  This list has 2 medication(s) that are the same as other medications prescribed for you. Read the directions carefully, and ask your doctor or other care provider to review them with you.         Where to get your medicines      These medications were sent to [facility] [address] [place] [address] [address] [facility]  [address] [place] [id number]     Phone:  [phone]     buPROPion 150 MG 12 hr tablet         Some of these will need a paper prescription and others can  be bought over the counter.  Ask your nurse if you have questions.     Bring a paper prescription for each of these medications     LORazepam 1 MG tablet                Primary Care Provider Office Phone # Fax #    Bita Perez [phone]       [address] [address]        Equal Access to Services     FELECIA AGUILAR : Hadii ofelia zapien hadasho Soomaali, waaxda luqadaha, qaybta kaalmada adejavieryada, karlee tam. So [facility] [phone].    ATENCIÓN: Si habla español, tiene a alfred disposición servicios gratuitos de asistencia lingüística. LlKettering Health Hamilton [phone].    We comply with applicable federal civil rights laws and [place] laws. We do not discriminate on the basis of race, color, national origin, age, disability sex, sexual orientation or gender identity.            Thank you!     Thank you for choosing [facility]  for your care. Our goal is always to provide you with excellent care. Hearing back from our patients is one way we can continue to improve our services. Please take a few minutes to complete the written survey that you may receive in the mail after your visit with us. Thank you!             Your Updated Medication List - Protect others around you: Learn how to safely use, store and throw away your medicines at www.disposemymeds.org.          This list is accurate as of: 9/12/17 11:59 AM.  Always use your most recent med list.                   Brand Name Dispense Instructions for use Diagnosis    buPROPion 150 MG 12 hr tablet    WELLBUTRIN SR    60 tablet    Take 1 tablet (150 mg) by mouth 2 times daily    Adjustment disorder with depressed mood       cabergoline 0.5 MG tablet    DOSTINEX    8 tablet    Take 0.5 tablets (0.25 mg) by mouth twice a week    Pituitary tumor       * LORazepam 1 MG tablet    ATIVAN     Take 1 mg by mouth        * LORazepam 1 MG tablet    ATIVAN    5 tablet    Take 1 tablet (1 mg)  by mouth every 8 hours as needed for anxiety (for flying)    Fear of flying       * Notice:  This list has 2 medication(s) that are the same as other medications prescribed for you. Read the directions carefully, and ask your doctor or other care provider to review them with you.

## 2017-09-12 NOTE — PATIENT INSTRUCTIONS
[facility] Medication Refill Request Information:  * Please contact your pharmacy regarding ANY request for medication refills.  ** [facility] Prescription Fax = [phone]  * Please allow 3 business days for routine medication refills.  * Please allow 5 business days for controlled substance medication refills.     [facility] Test Result notification information:  *You will be notified with in 7-10 days of your appointment day regarding the results of your test.  If you are on MyChart you will be notified as soon as the provider has reviewed the results and signed off on them.    [facility] [phone]

## 2017-09-12 NOTE — NURSING NOTE
Chief Complaint   Patient presents with     Establish Care     Patient here to establish care.       Freddie Michelle CMA at 10:33 AM on 9/12/2017.

## 2017-09-13 DIAGNOSIS — D49.7 PITUITARY TUMOR: ICD-10-CM

## 2017-09-13 RX ORDER — CABERGOLINE 0.5 MG/1
0.25 TABLET ORAL
Qty: 12 TABLET | Refills: 2 | Status: SHIPPED | OUTPATIENT
Start: 2017-09-14 | End: 2018-07-20

## 2017-09-20 NOTE — PROGRESS NOTES
"Soraida Shen MRN# [id number]   YOB: 1980 Age: 37 year old     Date: September 20, 2017     Reason for visit: fatigue    HPI:   Ms. Soraida Shen is a 37 year old female with PMH prolactinoma on Cabergoline, DCIS of R. Breast s/p lumpectomy and RTX (completed 01/2017) who presents today with several months of fatigue, poor sleep.     Patient reports that following completion of her breast cancer treatment she has had issues with fatigue and poor sleep.  She states that overall she is just very tired and all she wants to do is lay around at home and sleep. However, she notes that her sleep has also declined in recent months. She has difficulty with both falling and staying asleep. She usually tries to read when she can't sleep, which sometimes helps, but is not always effectively. She attempts to minimize screen time before bed, but often has difficulty doing so. Does not always go to bed at the same time each night.    After several minutes of discussion, the patient notes that she thinks the biggest problem is that she is feeling sad and depressed, stating \"I know I should be happy that I am cancer free and healthy, but I just can't help feeling depressed.\" She feels sad and also notes significant anhedonia, stating that she has a hard time motivating herself to do the things she liked to do, such as meeting friends out for dinner. She notes fatigue and sleep difficulties as above. Also notes increased appetite, stating that she used to like eating healthy but she now craves fast food and carbs all the time and has gained weight. She also reports difficulty with memory, particularly word findings, forgetting where she put her keys, difficulty focusing, overall feeling mentally slower than normal. Also states that she occasionally has thoughts that she is tired of living and wants to just \"be a hermit and never leave my house or have to deal with people or anything again.\" However, she denies SI, has no " "plan for self harm. States that she feels safe living alone at home. Other than recent breast cancer diagnosis and treatment, no other precipitating factors. Significant other lives in [place] and has been very supportive. Regarding prolactinoma, follows with endocrine, last seen 9/5. Pituitary mass stable on imaging, Prl level wnl at 23, TSH also checked at that visit and was wnl.     Patient Active Problem List   Diagnosis     Pituitary tumor     Ductal carcinoma in situ (DCIS) of right breast     ROS: Complete 10 point review of systems negative unless mentioned in HPI    Current Outpatient Prescriptions   Medication     LORazepam (ATIVAN) 1 MG tablet     buPROPion (WELLBUTRIN XL) 300 MG 24 hr tablet     cabergoline (DOSTINEX) 0.5 MG tablet     No current facility-administered medications for this visit.      Facility-Administered Medications Ordered in Other Visits   Medication     iopamidol (ISOVUE-M-300) 61% solution 5 mL       Exam:  /68  Pulse 71  Ht 1.645 m (5' 4.76\")  Wt 81.3 kg (179 lb 3.2 oz)  Breastfeeding? No  BMI 30.04 kg/m2  General: NAD, pleasant and cooperative with interview and exam, occasionally tearful   HEENT: NCAT, moist oral mucosa, oropharynx clear, anicteric sclera  Neck: no cervical lymphadenopathy or thyromegaly appreciated  CV: RRR, Normal S1, S2, no murmurs, rubs, thrills  Resp: non-labored respirations, CTAB, no rhonchi, crackles, wheezes appreciated  Abdomen: soft, nontender, nondistended, normoactive bs, no HSM  Extremities: WWP, no LE edema bilaterally  Skin: Warm and dry, no jaundice, rash, or concerning lesions  Neurological: alert and oriented, language fluent, no dysarthria, no gait abnormalities appreciated.  Psychological: appropriate mood     Labs:   Reviewed in epic; pertinent labs as follows  Component      Latest Ref Rng & Units 6/5/2017 9/5/2017   Prolactin      3 - 27 ug/L 23 15   Ins Growth Factor 1      80 - 277 ng/ml  162   T4 Free      0.76 - 1.46 ng/dL  " 1.19   TSH      0.40 - 4.00 mU/L  2.29   Calcium      8.5 - 10.1 mg/dL  9.2       Imaging:   Reviewed in Epic, pertinent imaging as follows:  MRI Brain 9/12  Impression: No significant change in size of the hypoenhancing lesion  in the left aspect of the pituitary gland, presumably microadenoma  compared to 9/23/2016.     A&P:   Ms. Soraida Shen is a 37 year old female with PMH prolactinoma on Cabergoline, DCIS of R. Breast s/p lumpectomy and RTX (completed 01/2017) who presents today with several months of fatigue, poor sleep.     # MDD  Patient reporting several months of symptoms of depressed mood, anhedonia, fatigue, sleep disturbance, psychomotor retardation, feelings of worthlessness, appetite changes; these symptoms for this duration c/w diagnosis of major depressive disorder. Suspect precipitated by recent stress of cancer diagnosis and treatment completed in 01/2017.  Basic labs completed at most recent endocrine appointment reviewed, Prl level, thyroid function labs all within normal limits. Less likely physiologic case in this case. Would recommend initiating anti-depressant therapy, patient's primary request that the medication does not make her gain more weight; given this request, Buproprion would be a good choice.  -- Initiate Buproprion 150mg BID  -- Health Psychology referral recommended, but patient would prefer to find one outside the system and declined referral; synergystic effect of medication and therapy explained to patient  -- Counseled patient regarding sleep hygiene strategies for improved sleep  -- RTC in 6 weeks for follow-up     # Fear of Flying:   Patient has longstanding fear of flying, takes Xanax for flights only. Has upcoming trip to [place] to visit boyfriend and requesting 5 pills for flights back and forth.   -- Lorazepam 1mg q8h prn as needed for flying  -- Risks and benefits of medication discussed, pt expressed understanding     Return to clinic: 6 weeks    Patient care plan  discussed with attending physician, Dr. Inman, who agreed with above.    Karen Conway MD  Internal Medicine, PGY-3  [phone]      Teaching Physician Note:  I was present during the visit and the patient was seen and examined by me.   I discussed the case with the resident and agree with the note as documented by the resident with the following exceptions:  None.    Bridgette Inman M.D.  Internal Medicine   pager [phone]

## 2017-11-14 DIAGNOSIS — F43.21 ADJUSTMENT DISORDER WITH DEPRESSED MOOD: ICD-10-CM

## 2017-11-15 NOTE — TELEPHONE ENCOUNTER
Last Written Prescription Date:  9/14/17  Last Fill Quantity: 30,   # refills: 1  Last Office Visit : 9/12/17  Future Office visit:  NONE    Routing refill request to provider for review/approval because: PHQ-9

## 2017-11-16 RX ORDER — BUPROPION HYDROCHLORIDE 300 MG/1
300 TABLET ORAL EVERY MORNING
Qty: 30 TABLET | Refills: 1 | Status: SHIPPED | OUTPATIENT
Start: 2017-11-16 | End: 2017-12-19

## 2017-11-16 NOTE — TELEPHONE ENCOUNTER
Ok for refill with change from 150 bid to 300 mg XL.  She was supposed to f/u with Dr. Conway in six weeks following her last appointment.  Please remind her to do so.  If Dr. Conway is not available, have April see one of her resident colleagues, Tuesday's.  Please call her.  Thanks.  SB    Message left with plan of care . Clinic numbers given for question and concerns.  Chante Cisneros RN 9:47 AM on 11/16/2017.

## 2017-12-11 ENCOUNTER — ONCOLOGY VISIT (OUTPATIENT)
Dept: ONCOLOGY | Facility: CLINIC | Age: 37
End: 2017-12-11
Attending: INTERNAL MEDICINE
Payer: COMMERCIAL

## 2017-12-11 VITALS
OXYGEN SATURATION: 96 % | HEART RATE: 85 BPM | WEIGHT: 178.3 LBS | DIASTOLIC BLOOD PRESSURE: 80 MMHG | SYSTOLIC BLOOD PRESSURE: 116 MMHG | TEMPERATURE: 97.8 F | BODY MASS INDEX: 29.71 KG/M2 | HEIGHT: 65 IN

## 2017-12-11 DIAGNOSIS — R92.8 ABNORMAL MAGNETIC RESONANCE IMAGING OF LEFT BREAST: Primary | ICD-10-CM

## 2017-12-11 DIAGNOSIS — R92.8 ABNORMAL MRI, BREAST: ICD-10-CM

## 2017-12-11 DIAGNOSIS — Z12.39 BREAST CANCER SCREENING, HIGH RISK PATIENT: ICD-10-CM

## 2017-12-11 DIAGNOSIS — D05.11 DUCTAL CARCINOMA IN SITU (DCIS) OF RIGHT BREAST: Primary | ICD-10-CM

## 2017-12-11 PROCEDURE — 99212 OFFICE O/P EST SF 10 MIN: CPT | Mod: ZF

## 2017-12-11 PROCEDURE — 99214 OFFICE O/P EST MOD 30 MIN: CPT | Mod: ZP | Performed by: INTERNAL MEDICINE

## 2017-12-11 ASSESSMENT — PAIN SCALES - GENERAL: PAINLEVEL: NO PAIN (1)

## 2017-12-11 NOTE — MR AVS SNAPSHOT
After Visit Summary   12/11/2017    Soraida Shen    MRN: [id number]           Patient Information     Date Of Birth          1980        Visit Information        Provider Department      12/11/2017 1:45 PM Valerie Delgado MD [facility]        Today's Diagnoses     Ductal carcinoma in situ (DCIS) of right breast    -  1    Breast cancer screening, high risk patient        Abnormal MRI, breast           Follow-ups after your visit        Your next 10 appointments already scheduled     Dec 19, 2017  8:35 AM CST   (Arrive by 8:20 AM)   Return Visit with Drake Pal MD   [facility] ([facility])    [address] [facility]  4th Floor  [facility] [address] [phone]              Future tests that were ordered for you today     Open Future Orders        Priority Expected Expires Ordered    MA Screen Bilateral w/Rian Routine 6/11/2018 12/10/2018 12/10/2017            Who to contact     If you have questions or need follow up information about today's clinic visit or your schedule please contact [facility] directly at [phone].  Normal or non-critical lab and imaging results will be communicated to you by [employer], letter or phone within 4 business days after the clinic has received the results. If you do not hear from us within 7 days, please contact the clinic through [employer] or phone. If you have a critical or abnormal lab result, we will notify you by phone as soon as possible.  Submit refill requests through [employer] or call your pharmacy and they will forward the refill request to us. Please allow 3 business days for your refill to be completed.          Additional Information About Your Visit        [employer] Information     [employer] gives you secure access to your electronic health record. If you see a primary care provider, you can also send messages to your care team and make  "appointments. If you have questions, please call your primary care clinic.  If you do not have a primary care provider, please call [phone] and they will assist you.        Care EveryWhere ID     This is your Care EveryWhere ID. This could be used by other organizations to access your [place] medical records  [id number]        Your Vitals Were     Pulse Temperature Height Pulse Oximetry BMI (Body Mass Index)       85 97.8  F (36.6  C) (Oral) 1.645 m (5' 4.76\") 96% 29.89 kg/m2        Blood Pressure from Last 3 Encounters:   12/11/17 116/80   09/12/17 109/68   09/05/17 106/71    Weight from Last 3 Encounters:   12/11/17 80.9 kg (178 lb 4.8 oz)   09/12/17 81.3 kg (179 lb 3.2 oz)   09/05/17 81.3 kg (179 lb 4.8 oz)               Primary Care Provider Office Phone # Fax #    Bita Perez [phone]       [place] [address]        Equal Access to Services     [facility]: Hadii ofelia rene Soelise, waaxda lucarolyn, qaybta kaalmaadonis marte, karlee white . So [facility] [phone].    ATENCIÓN: Si habla español, tiene a alfred disposición servicios gratuitos de asistencia lingüística. LlAshtabula General Hospital [phone].    We comply with applicable federal civil rights laws and [place] laws. We do not discriminate on the basis of race, color, national origin, age, disability, sex, sexual orientation, or gender identity.            Thank you!     Thank you for choosing [facility]  for your care. Our goal is always to provide you with excellent care. Hearing back from our patients is one way we can continue to improve our services. Please take a few minutes to complete the written survey that you may receive in the mail after your visit with us. Thank you!             Your Updated Medication List - Protect others around you: Learn how to safely use, store and throw away your medicines at www.disposemymeds.org.        "   This list is accurate as of: 12/11/17  2:52 PM.  Always use your most recent med list.                   Brand Name Dispense Instructions for use Diagnosis    buPROPion 300 MG 24 hr tablet    WELLBUTRIN XL    30 tablet    Take 1 tablet (300 mg) by mouth every morning See primary care doctor for additional refills.    Adjustment disorder with depressed mood       cabergoline 0.5 MG tablet    DOSTINEX    12 tablet    Take 0.5 tablets (0.25 mg) by mouth twice a week    Pituitary tumor       LORazepam 1 MG tablet    ATIVAN    5 tablet    Take 1 tablet (1 mg) by mouth every 8 hours as needed for anxiety (for flying)    Fear of flying

## 2017-12-11 NOTE — LETTER
12/11/2017       RE: Soraida Shen  [address]  [facility] [id number]     Dear Colleague,    Thank you for referring your patient, Soraida Shen, to the [facility]. Please see a copy of my visit note below.    Oncology Follow Up:  Date on this visit: 12/11/2017    Diagnosis:  [id number], low-grade, ER/NH positive, solid and cribriform type, right breast DCIS.    Primary Physician: Bita Perez     History Of Present Illness:  Ms. Shen is a 37 year old female with a h/o pituitary microadenoma with right breast DCIS.  Ms. Shen first noted a clear/yellowish discharge from the right nipple in the fall of 2015.  Mammogram showed no concerning findings.  Right breast ultrasound showed prominent ducts in the subareolar right breast.  Ductogram was attempted but was unsuccessful.  Contrast enhanced mammogram showed a 9 mm mass in the lateral right breast at the 7 o'clock position, 8-9 cm from the nipple.  Ultrasound showed dilated ducts with filling defects at the 8:30 position, 5 cm from the nipple.  The most prominent filling defect measured 1.2 cm.  Right breast biopsy showed intraductal papilloma without atypia.  She was referred to see a surgeon.  She was told that one of our surgeons removes these and the other does not.  Because she was told that one of our surgeons does not remove them, she did not feel it was important to see a surgeon.      In the summer of 2016, she saw her PCP, who recommended she follow through with seeing a surgeon.  Repeat right breast ultrasound showed mass at the area of previous biopsy to measure 6 mm and was felt to be unchanged from prior.  On 9/28/16, right breast excisional biopsy was performed by Dr. Dorantes.  Pathology showed low grade, papillary, solid, and cribriform type DCIS measuring 3 mm.  No comedonecrosis.  Surgical margin was negative, but <1 mm.  Estrogen and progesterone receptor staining were positive in >95% of tumor cells.  The focus of DCIS  was within intraductal papilloma.  She received 4240 cGy in 16 fractions (12/13/16-1/4/17) to the right breast and boost of 1000 cGy in 4 fractions (1/5/17-1/12/17) to the tumor site.  She declined adjuvant endocrine therapy due to h/o mood disorder.    Ms. Shen had genetic counseling and BreastNext testing returned negative.     Interval History:  April comes into clinic today for routine 6 month DCIS follow up.  She states that her health has been good since last visit.  She has had problems maintaining a PCP.  She has seen some residents, who have now left.  Since our last visit, she started wellbutrin and feels this has greatly helped to stabilize mood.  She took an ativan prior to our visit today and states she is much less nervous about the visit.  She states her breasts always have a lumpy feel.  She denies dominant lumps.  She has an occasional sharp pain in the upper outer right breast.  She denies nipple discharge.  Most bothersome to her at this time is vaginal bleeding and pain after intercourse.  This has been present on and off since she had an IUD placed about a year ago.  She denies vaginal dryness.  She continues to have symptoms of ovulation including cyclic pelvic cramping.  She has not had a pelvic exam in sometime due to a number of primary care physicians leaving their practice.  She denies bone or joint aches or pains.  She has no cough, shortness of breath, or chest pain.  No abdominal complaints.  The remainder of a 10 point review of systems is otherwise negative.      Past Medical/Surgical History:  Past Medical History:   Diagnosis Date     Anxiety      Breast CA (H)      Depression      Intraductal papilloma of breast 2015    right; biopsy proven 11/25/15     Peptic ulcer disease      Pituitary microadenoma (H) 2010    6 mm      Prolactinoma (H)     6 mm pituitary mass     Past Surgical History:   Procedure Laterality Date     LUMPECTOMY BREAST Right 9/28/2016    Procedure: LUMPECTOMY  "BREAST;  Surgeon: Andreia Dorantes MD;  Location: [facility] OR     wisdom teeth extraction       Allergies:  Allergies as of 12/11/2017 - Nacho as Reviewed 09/12/2017   Allergen Reaction Noted     Fruit & vegetable Hives 09/06/2016     Current Medications:  Current Outpatient Prescriptions   Medication Sig Dispense Refill     buPROPion (WELLBUTRIN XL) 300 MG 24 hr tablet Take 1 tablet (300 mg) by mouth every morning See primary care doctor for additional refills. 30 tablet 1     cabergoline (DOSTINEX) 0.5 MG tablet Take 0.5 tablets (0.25 mg) by mouth twice a week 12 tablet 2     LORazepam (ATIVAN) 1 MG tablet Take 1 tablet (1 mg) by mouth every 8 hours as needed for anxiety (for flying) 5 tablet 0      Family History and Social History:  Reviewed and unchanged from prior.  Please see initial consultation dated 2/13/17 for further details.    Physical Exam:  /80 (BP Location: Right arm, Patient Position: Sitting, Cuff Size: Adult Regular)  Pulse 85  Temp 97.8  F (36.6  C) (Oral)  Ht 1.645 m (5' 4.76\")  Wt 80.9 kg (178 lb 4.8 oz)  SpO2 96%  BMI 29.89 kg/m2  General:  Well appearing, well-nourished adult female in NAD.  HEENT:  Normocephalic.  Sclera anicteric.  MMM.  No lesions of the oropharynx.  Lymph:  No cervical, supraclavicular, or axillary LAD.  Chest:  CTA bilaterally.  No wheezes or crackles.  CV:  RRR.  Nl S1 and S2.  No m/r/g.  Breast:  Bilateral breasts are of increased fibroglandular density.  There is hyperpigmentation of the right breast in the distribution of the radiation field.  There are no discretely palpable masses in either breast.  There is increased fibrous density in the left breast at 6 o'clock, 2 cm from the nipple.  Bilateral nipples are everted.  No nipple discharge.  Abd:  Soft/NT/ND.  BSs normoactive.  No hepatosplenomegaly.  Ext:  No pitting edema of the bilateral lower extremities.  Pulses 2+ and symmetric.  Musculo:  Strength 5/5 throughout.  Neuro:  Cranial nerves grossly " intact.  Psych:  Mood and affect appear normal.    Laboratory/Imaging Studies:  12/8/17 MRI breast:  Enhancement left breast posterior to the nipple.    ASSESSMENT/PLAN:  April is a 36 yo premenopausal female with a stage 0, [id number], low-grade, 3 mm, cribriform, papillary, and solid type, ER positive, CT positive, DCIS of the right breast.  She completed right breast radiation in 01/2017.     1.  Right breast DCIS:  April is 1 year 2.5 months out from excision of right breast DCIS.  Breast MRI performed last week shows a small area of enhancement in the posterior left breast.  Will obtain left breast ultrasound today for further evaluation.  I suspect she will need biopsy of this area.  If found to be benign, we will resume routine surveillance visits.  I will see her back in 6 months for her next visit.  Given increased breast density and young age at diagnosis, we are screening with both annual mammogram with tomosynthesis and breast MRI spaced so that she is having some form of imaging every 6 months.  She will be due for bilateral mammography at the time of her return visit in 6 months.  I recommend that mammograms be performed with tomosynthesis.      2.  Anxiety:  Improved since last visit.  Continue wellbutrin.  Continue to take ativan as needed prior to oncology visits.     3.  Vaginal pain and bleeding:  Unclear etiology.  She is not on endocrine therapy.  Advised to continue to use a lubricant with intercourse (she is using coconut oil).  Also advised she be seen for a pelvic exam.  I gave her Adina Radhapat's name as a reference.  Adina is now practicing out of the [facility].    4.  Left breast ultrasound today.  Return in 6 months for bilateral mammograms with tomosynthesis and visit with me.    It was a pleasure to see April in clinic today.  A total of 20 minutes of our 25 minute face to face visit was spent in counseling.        Again, thank you for allowing me to participate in the care  of your patient.      Sincerely,    Valerie Delgado MD

## 2017-12-11 NOTE — NURSING NOTE
"Oncology Rooming Note    December 11, 2017 1:45 PM   April Cindy Shen is a 37 year old female who presents for:    Chief Complaint   Patient presents with     Oncology Clinic Visit     Follow up-Breast CA     Initial Vitals: /80 (BP Location: Right arm, Patient Position: Sitting, Cuff Size: Adult Regular)  Pulse 85  Temp 97.8  F (36.6  C) (Oral)  Ht 1.645 m (5' 4.76\")  Wt 80.9 kg (178 lb 4.8 oz)  SpO2 96%  BMI 29.89 kg/m2 Estimated body mass index is 29.89 kg/(m^2) as calculated from the following:    Height as of this encounter: 1.645 m (5' 4.76\").    Weight as of this encounter: 80.9 kg (178 lb 4.8 oz). Body surface area is 1.92 meters squared.  No Pain (1) Comment: Data Unavailable   No LMP recorded. Patient is not currently having periods (Reason: IUD).  Allergies reviewed: Yes  Medications reviewed: Yes    Medications: Medication refills not needed today.  Pharmacy name entered into [facility]: [facility] [address] [place] [address]    Clinical concerns: MRI Results Dr. Delgado was notified.    10 minutes for nursing intake (face to face time)     Debi Dykes LPN              "

## 2017-12-11 NOTE — PROGRESS NOTES
Oncology Follow Up/Preoperative physical:  Date on this visit: 12/11/2017    Diagnosis:  [id number], low-grade, ER/MT positive, solid and cribriform type, right breast DCIS.    Primary Physician: Bita Perez     History Of Present Illness:  Ms. Shen is a 37 year old female with a h/o pituitary microadenoma with right breast DCIS.  Ms. Shen first noted a clear/yellowish discharge from the right nipple in the fall of 2015.  Mammogram showed no concerning findings.  Right breast ultrasound showed prominent ducts in the subareolar right breast.  Ductogram was attempted but was unsuccessful.  Contrast enhanced mammogram showed a 9 mm mass in the lateral right breast at the 7 o'clock position, 8-9 cm from the nipple.  Ultrasound showed dilated ducts with filling defects at the 8:30 position, 5 cm from the nipple.  The most prominent filling defect measured 1.2 cm.  Right breast biopsy showed intraductal papilloma without atypia.  She was referred to see a surgeon.  She was told that one of our surgeons removes these and the other does not.  Because she was told that one of our surgeons does not remove them, she did not feel it was important to see a surgeon.      In the summer of 2016, she saw her PCP, who recommended she follow through with seeing a surgeon.  Repeat right breast ultrasound showed mass at the area of previous biopsy to measure 6 mm and was felt to be unchanged from prior.  On 9/28/16, right breast excisional biopsy was performed by Dr. Dorantes.  Pathology showed low grade, papillary, solid, and cribriform type DCIS measuring 3 mm.  No comedonecrosis.  Surgical margin was negative, but <1 mm.  Estrogen and progesterone receptor staining were positive in >95% of tumor cells.  The focus of DCIS was within intraductal papilloma.  She received 4240 cGy in 16 fractions (12/13/16-1/4/17) to the right breast and boost of 1000 cGy in 4 fractions (1/5/17-1/12/17) to the tumor site.  She declined adjuvant endocrine  therapy due to h/o mood disorder.    Ms. Shen had genetic counseling and BreastNext testing returned negative.     Interval History:  April comes into clinic today for routine 6 month DCIS follow up.  She states that her health has been good since last visit.  She has had problems maintaining a PCP.  She has seen some residents, who have now left.  Since our last visit, she started wellbutrin and feels this has greatly helped to stabilize mood.  She took an ativan prior to our visit today and states she is much less nervous about the visit.  She states her breasts always have a lumpy feel.  She denies dominant lumps.  She has an occasional sharp pain in the upper outer right breast.  She denies nipple discharge.  Most bothersome to her at this time is vaginal bleeding and pain after intercourse.  This has been present on and off since she had an IUD placed about a year ago.  She denies vaginal dryness.  She continues to have symptoms of ovulation including cyclic pelvic cramping.  She has not had a pelvic exam in sometime due to a number of primary care physicians leaving their practice.  She denies bone or joint aches or pains.  She has no cough, shortness of breath, or chest pain.  No abdominal complaints.  The remainder of a 10 point review of systems is otherwise negative.      Past Medical/Surgical History:  Past Medical History:   Diagnosis Date     Anxiety      Breast CA (H)      Depression      Intraductal papilloma of breast 2015    right; biopsy proven 11/25/15     Peptic ulcer disease      Pituitary microadenoma (H) 2010    6 mm      Prolactinoma (H)     6 mm pituitary mass     Past Surgical History:   Procedure Laterality Date     LUMPECTOMY BREAST Right 9/28/2016    Procedure: LUMPECTOMY BREAST;  Surgeon: Andreia Dorantes MD;  Location: [facility] OR     wisdom teeth extraction       Allergies:  Allergies as of 12/11/2017 - Nacho as Reviewed 09/12/2017   Allergen Reaction Noted     Fruit & vegetable Hives  "09/06/2016     Current Medications:  Current Outpatient Prescriptions   Medication Sig Dispense Refill     buPROPion (WELLBUTRIN XL) 300 MG 24 hr tablet Take 1 tablet (300 mg) by mouth every morning See primary care doctor for additional refills. 30 tablet 1     cabergoline (DOSTINEX) 0.5 MG tablet Take 0.5 tablets (0.25 mg) by mouth twice a week 12 tablet 2     LORazepam (ATIVAN) 1 MG tablet Take 1 tablet (1 mg) by mouth every 8 hours as needed for anxiety (for flying) 5 tablet 0      Family History and Social History:  Reviewed and unchanged from prior.  Please see initial consultation dated 2/13/17 for further details.    Physical Exam:  /80 (BP Location: Right arm, Patient Position: Sitting, Cuff Size: Adult Regular)  Pulse 85  Temp 97.8  F (36.6  C) (Oral)  Ht 1.645 m (5' 4.76\")  Wt 80.9 kg (178 lb 4.8 oz)  SpO2 96%  BMI 29.89 kg/m2  General:  Well appearing, well-nourished adult female in NAD.  HEENT:  Normocephalic.  Sclera anicteric.  MMM.  No lesions of the oropharynx.  Lymph:  No cervical, supraclavicular, or axillary LAD.  Chest:  CTA bilaterally.  No wheezes or crackles.  CV:  RRR.  Nl S1 and S2.  No m/r/g.  Breast:  Bilateral breasts are of increased fibroglandular density.  There is hyperpigmentation of the right breast in the distribution of the radiation field.  There are no discretely palpable masses in either breast.  There is increased fibrous density in the left breast at 6 o'clock, 2 cm from the nipple.  Bilateral nipples are everted.  No nipple discharge.  Abd:  Soft/NT/ND.  BSs normoactive.  No hepatosplenomegaly.  Ext:  No pitting edema of the bilateral lower extremities.  Pulses 2+ and symmetric.  Musculo:  Strength 5/5 throughout.  Neuro:  Cranial nerves grossly intact.  Psych:  Mood and affect appear normal.    Laboratory/Imaging Studies:  12/8/17 MRI breast:  Enhancement left breast posterior to the nipple.    ASSESSMENT/PLAN:  April is a 36 yo premenopausal female with a " stage 0, [id number], low-grade, 3 mm, cribriform, papillary, and solid type, ER positive, MS positive, DCIS of the right breast.  She completed right breast radiation in 01/2017.     1.  Right breast DCIS:  April is 1 year 2.5 months out from excision of right breast DCIS.  Breast MRI performed last week shows a small area of enhancement in the posterior left breast.  Will obtain left breast ultrasound today for further evaluation.  I suspect she will need biopsy of this area.  If found to be benign, we will resume routine surveillance visits.  I will see her back in 6 months for her next visit.  Given increased breast density and young age at diagnosis, we are screening with both annual mammogram with tomosynthesis and breast MRI spaced so that she is having some form of imaging every 6 months.  She will be due for bilateral mammography at the time of her return visit in 6 months.  I recommend that mammograms be performed with tomosynthesis.      2.  Anxiety:  Improved since last visit.  Continue wellbutrin.  Continue to take ativan as needed prior to oncology visits.     3.  Vaginal pain and bleeding:  Unclear etiology.  She is not on endocrine therapy.  Advised to continue to use a lubricant with intercourse (she is using coconut oil).  Also advised she be seen for a pelvic exam.  I gave her Adina Wise's name as a reference.  Adina is now practicing out of the [facility].    4.  Left breast ultrasound today.  Return in 6 months for bilateral mammograms with tomosynthesis and visit with me.    It was a pleasure to see April in clinic today.  A total of 20 minutes of our 25 minute face to face visit was spent in counseling.    1/3/18:  Biopsy of left breast lesion shows PASH and <1 mm papilloma without atypia.  Patient has met in consultation with Dr. Dorantes and lumpectomy excision of this area was recommended.  The above note is to serve as a preoperative physical for Ms. Shen.  Based on my history and  physical performed on the day of our clinic visit, it is my opinion that she is at low risk for perioperative cardiac event around this low risk procedure.

## 2017-12-19 ENCOUNTER — OFFICE VISIT (OUTPATIENT)
Dept: INTERNAL MEDICINE | Facility: CLINIC | Age: 37
End: 2017-12-19
Payer: COMMERCIAL

## 2017-12-19 VITALS
WEIGHT: 177 LBS | SYSTOLIC BLOOD PRESSURE: 117 MMHG | OXYGEN SATURATION: 97 % | BODY MASS INDEX: 29.67 KG/M2 | HEART RATE: 96 BPM | DIASTOLIC BLOOD PRESSURE: 75 MMHG | RESPIRATION RATE: 20 BRPM

## 2017-12-19 DIAGNOSIS — F43.21 ADJUSTMENT DISORDER WITH DEPRESSED MOOD: ICD-10-CM

## 2017-12-19 RX ORDER — BUPROPION HYDROCHLORIDE 300 MG/1
300 TABLET ORAL EVERY MORNING
Qty: 60 TABLET | Refills: 3 | Status: SHIPPED | OUTPATIENT
Start: 2017-12-19

## 2017-12-19 ASSESSMENT — PAIN SCALES - GENERAL: PAINLEVEL: NO PAIN (0)

## 2017-12-19 ASSESSMENT — PATIENT HEALTH QUESTIONNAIRE - PHQ9: SUM OF ALL RESPONSES TO PHQ QUESTIONS 1-9: 5

## 2017-12-19 NOTE — NURSING NOTE
Chief Complaint   Patient presents with     Refill Request     wellbutrin   Tamika Hwang LPN 8:46 AM on 12/19/2017    Rooming Note  Health Maintenance   Health Maintenance Due   Topic Date Due     PAP SCREENING Q3 YR (SYSTEM ASSIGNED)  01/22/2001    All health maintenance items discussed and pended.  Tamika Hwang LPN 8:51 AM on 12/19/2017

## 2017-12-19 NOTE — MR AVS SNAPSHOT
After Visit Summary   12/19/2017    Soraida Shen    MRN: [id number]           Patient Information     Date Of Birth          1980        Visit Information        Provider Department      12/19/2017 8:35 AM Drake Pal MD [facility]        Today's Diagnoses     Adjustment disorder with depressed mood          Care Instructions    [facility]: [phone]     [facility] Medication Refill Request Information:  * Please contact your pharmacy regarding ANY request for medication refills.  ** [facility] Prescription Fax = [phone]  * Please allow 3 business days for routine medication refills.  * Please allow 5 business days for controlled substance medication refills.     Primary Care Center Test Result notification information:  *You will be notified with in 7-10 days of your appointment day regarding the results of your test.  If you are on MyChart you will be notified as soon as the provider has reviewed the results and signed off on them.          Follow-ups after your visit        Your next 10 appointments already scheduled     Dec 22, 2017 10:15 AM CST   (Arrive by 10:00 AM)   MR BREAST PERCUTANEOUS NEEDLE CORE BX with MGMR1, MG BREAST RAD, MG BREAST NURSE   [facility] ([facility])    [address] [place] [id number] [phone]           Take your medicines as usual, unless your doctor tells you not to. Bring a list of your current medicines to your exam (including vitamins, minerals and over-the-counter drugs).  Have a blood test (creatinine test) within 30 days of your exam. Go to your clinic or Diagnostic Imaging Department for this test.  The MRI machine uses a strong magnet. Please wear clothes without metal (snaps, zippers). A sweatsuit works well, or we may give you a hospital gown.  Please remove any body piercings and hair extensions before you arrive. You will also remove watches,  jewelry, hairpins, wallets, dentures, partial dental plates and hearing aids. You may wear contact lenses, and you may be able to wear your rings. We have a safe place to keep your personal items, but it is safer to leave them at home.   **IMPORTANT** THE INSTRUCTIONS BELOW ARE ONLY FOR THOSE PATIENTS WHO HAVE BEEN TOLD THEY WILL RECEIVE SEDATION OR GENERAL ANESTHESIA DURING THEIR MRI PROCEDURE:  IF YOU WILL RECEIVE SEDATION (take medicine to help you relax during your exam)   You must get the medicine from your doctor before you arrive. Bring the medicine to the exam. Do not take it at home.   Arrive one hour early. Bring someone who can take you home after the test. Your medicine will make you sleepy. After the exam, you may not drive, take a bus or take a taxi by yourself.   No eating 8 hours before your exam. You may have clear liquids up until 4 hours before your exam. (Clear liquids include water, clear tea, black coffee and fruit juice without pulp.)  IF YOU WILL RECEIVE ANESTHESIA (be asleep for your exam)   Arrive 1 1/2 hours early. Bring someone who can take you home after the test. You may not drive, take a bus or take a taxi by yourself.   No eating 8 hours before your exam. You may have clear liquids up until 4 hours before your exam. (Clear liquids include water, clear tea, black coffee and fruit juice without pulp.)  If you have any questions, please contact your Imaging Department exam site.            Dec 22, 2017 12:15 PM CST   MA POST PROCEDURE LEFT with MGMA2   [facility] ([facility])    [id number] [place] [address] [phone]           Do not use any powder, lotion or deodorant under your arms or on your breast. If you do, we will ask you to remove it before your exam.  Wear comfortable, two-piece clothing.  If you have any allergies, tell your care team.  Bring any previous mammograms from other facilities or have them mailed to the  breast center.              Who to contact     Please call your clinic at [phone] to:    Ask questions about your health    Make or cancel appointments    Discuss your medicines    Learn about your test results    Speak to your doctor   If you have compliments or concerns about an experience at your clinic, or if you wish to file a complaint, please contact [facility] Patient Relations at [phone] or email us at [email or web address]         Additional Information About Your Visit        [employer] Network[employer] Information     [employer] gives you secure access to your electronic health record. If you see a primary care provider, you can also send messages to your care team and make appointments. If you have questions, please call your primary care clinic.  If you do not have a primary care provider, please call [phone] and they will assist you.      Doblet is an electronic gateway that provides easy, online access to your medical records. With Doblet, you can request a clinic appointment, read your test results, renew a prescription or communicate with your care team.     To access your existing account, please contact your [facility] or call [phone] for assistance.        Care EveryWhere ID     This is your Care EveryWhere ID. This could be used by other organizations to access your [place] medical records  [id number]        Your Vitals Were     Pulse Respirations Pulse Oximetry BMI (Body Mass Index)          96 20 97% 29.67 kg/m2         Blood Pressure from Last 3 Encounters:   12/19/17 117/75   12/11/17 116/80   09/12/17 109/68    Weight from Last 3 Encounters:   12/19/17 80.3 kg (177 lb)   12/11/17 80.9 kg (178 lb 4.8 oz)   09/12/17 81.3 kg (179 lb 3.2 oz)              Today, you had the following     No orders found for display         Where to get your medicines      These medications were sent to [facility] -  [place], [place] - [address]  [address], [facility] [id number]    Hours:  TRANSPLANT PHONE NUMBER [phone] Phone:  [phone]     buPROPion 300 MG 24 hr tablet          Primary Care Provider Office Phone # Fax #    Bita Perez [phone]       [address] [address]        Equal Access to Services     FELECIA AGUILAR : Hadii aad ku hadasho Soomaali, waaxda luqadaha, qaybta kaalmada adeegyada, waxay idiin hayaan adeeg boyd laernesto tam. So [facility] [phone].    ATENCIÓN: Si camron scott, tiene a alfred disposición servicios gratuitos de asistencia lingüística. [facility] [phone].    We comply with applicable federal civil rights laws and [place] laws. We do not discriminate on the basis of race, color, national origin, age, disability, sex, sexual orientation, or gender identity.            Thank you!     Thank you for choosing [facility]  for your care. Our goal is always to provide you with excellent care. Hearing back from our patients is one way we can continue to improve our services. Please take a few minutes to complete the written survey that you may receive in the mail after your visit with us. Thank you!             Your Updated Medication List - Protect others around you: Learn how to safely use, store and throw away your medicines at www.disposemymeds.org.          This list is accurate as of: 12/19/17  9:14 AM.  Always use your most recent med list.                   Brand Name Dispense Instructions for use Diagnosis    buPROPion 300 MG 24 hr tablet    WELLBUTRIN XL    60 tablet    Take 1 tablet (300 mg) by mouth every morning See primary care doctor for additional refills.    Adjustment disorder with depressed mood       cabergoline 0.5 MG tablet    DOSTINEX    12 tablet    Take 0.5 tablets (0.25 mg) by mouth twice a week    Pituitary tumor       LORazepam 1 MG tablet    ATIVAN    5 tablet     Take 1 tablet (1 mg) by mouth every 8 hours as needed for anxiety (for flying)    Fear of flying

## 2017-12-19 NOTE — PROGRESS NOTES
PRIMARY CARE CENTER       SUBJECTIVE:  April Cindy Shen is a 37 year old female with a PMHx of prolactinoma on cabergoline, DCIS or right breast s/p lumpectomy and radiation therapy, as well as a new diagnosis of depression who started bupropion in September 2017 and comes back for follow-up.     She reports symptoms are overall significantly improved- she has a much more positive outlook on life right now, for example if she has a bad week at work it's not the end of the world and she just tells herself she'll have a better week next week. She also has made some positive changes at work which reduced stress levels. She says her sleep is better, she no longer feels helpless about her weight- her mindset has changed to be more positive and her to be more proactive about her health. She is exercising and generally very happy with the medication and the changes it created in her life.     No appreciable negative side effects, such as tachycardia/palpitations, night sweats, insomnia (sleep she says is better) or weight loss.      Medications and allergies reviewed by me today.     ROS:   Constitutional, HEENT, cardiovascular, pulmonary, gi and gu systems are negative, except as otherwise noted.      OBJECTIVE:  /75 (BP Location: Right arm, Patient Position: Sitting, Cuff Size: Adult Regular)  Pulse 96  Resp 20  Wt 80.3 kg (177 lb)  SpO2 97%  BMI 29.67 kg/m2   Wt Readings from Last 1 Encounters:   12/19/17 80.3 kg (177 lb)       GENERAL APPEARANCE: healthy, alert and no distress     EYES: EOMI, PERRL     NECK: no adenopathy, no asymmetry, masses, or scars and thyroid normal to palpation     RESP: lungs clear to auscultation - no rales, rhonchi or wheezes     CV: regular rates and rhythm, normal S1 S2, no S3 or S4 and no murmur, click or rub     ABDOMEN:  soft, nontender, no HSM or masses and bowel sounds normal     MS: extremities normal- no gross deformities noted, no evidence of  inflammation in joints, FROM in all extremities.     NEURO: Normal strength and tone, sensory exam grossly normal, mentation intact and speech normal     PSYCH: bright affect, no psychomotor retardation, seems at ease, fluent and fluid speech,      LYMPHATICS: No cervical, or supraclavicular nodes     ASSESSMENT/PLAN:    April was seen today for refill request.     Adjustment disorder with depressed mood- Doing well on Wellbutrin, no negative side-effects. Going to refill today for 6 month supply, and plan to follow up in 6 months.   -     buPROPion (WELLBUTRIN XL) 300 MG 24 hr tablet; Take 1 tablet (300 mg) by mouth every morning See primary care doctor for additional refills.         Pt should return to clinic for f/u with me in 6 months    Drake Pal MD  Dec 19, 2017    Pt was seen and plan of care discussed with Dr. Garduno.     Pt was seen and examined with Dr. Pal.  I agree with her  documentation as noted above.    My additional comments: none    Dasia Garduno MD

## 2017-12-19 NOTE — PATIENT INSTRUCTIONS
[facility]: [phone]     [facility] Medication Refill Request Information:  * Please contact your pharmacy regarding ANY request for medication refills.  ** [facility] Prescription Fax = [phone]  * Please allow 3 business days for routine medication refills.  * Please allow 5 business days for controlled substance medication refills.     [facility] Test Result notification information:  *You will be notified with in 7-10 days of your appointment day regarding the results of your test.  If you are on MyChart you will be notified as soon as the provider has reviewed the results and signed off on them.

## 2017-12-22 ENCOUNTER — RADIANT APPOINTMENT (OUTPATIENT)
Dept: MRI IMAGING | Facility: CLINIC | Age: 37
End: 2017-12-22
Attending: INTERNAL MEDICINE
Payer: COMMERCIAL

## 2017-12-22 ENCOUNTER — RADIANT APPOINTMENT (OUTPATIENT)
Dept: MAMMOGRAPHY | Facility: CLINIC | Age: 37
End: 2017-12-22
Attending: INTERNAL MEDICINE
Payer: COMMERCIAL

## 2017-12-22 DIAGNOSIS — N63.0 LUMP OR MASS IN BREAST: Primary | ICD-10-CM

## 2017-12-22 DIAGNOSIS — R92.8 ABNORMAL MAGNETIC RESONANCE IMAGING OF LEFT BREAST: ICD-10-CM

## 2017-12-22 PROCEDURE — A9585 GADOBUTROL INJECTION: HCPCS | Performed by: INTERNAL MEDICINE

## 2017-12-22 PROCEDURE — 88305 TISSUE EXAM BY PATHOLOGIST: CPT | Performed by: INTERNAL MEDICINE

## 2017-12-22 PROCEDURE — 19085 BX BREAST 1ST LESION MR IMAG: CPT | Performed by: RADIOLOGY

## 2017-12-22 RX ORDER — LIDOCAINE HYDROCHLORIDE AND EPINEPHRINE 10; 10 MG/ML; UG/ML
16 INJECTION, SOLUTION INFILTRATION; PERINEURAL ONCE
Status: COMPLETED | OUTPATIENT
Start: 2017-12-22 | End: 2017-12-22

## 2017-12-22 RX ORDER — GADOBUTROL 604.72 MG/ML
10 INJECTION INTRAVENOUS ONCE
Status: COMPLETED | OUTPATIENT
Start: 2017-12-22 | End: 2017-12-22

## 2017-12-22 RX ADMIN — GADOBUTROL 8 ML: 604.72 INJECTION INTRAVENOUS at 11:38

## 2017-12-22 RX ADMIN — Medication 1 MEQ: at 12:23

## 2017-12-22 RX ADMIN — LIDOCAINE HYDROCHLORIDE AND EPINEPHRINE 16 ML: 10; 10 INJECTION, SOLUTION INFILTRATION; PERINEURAL at 11:17

## 2017-12-28 ENCOUNTER — TELEPHONE (OUTPATIENT)
Dept: GENERAL RADIOLOGY | Facility: CLINIC | Age: 37
End: 2017-12-28

## 2017-12-28 LAB — COPATH REPORT: NORMAL

## 2017-12-28 NOTE — TELEPHONE ENCOUNTER
Spoke with patient regarding left breast biopsy results, which indicate fibrocystic changes with a <1mm papilloma. Notified pt that the radiologist recommendation is surgical consultation. Pt was previously a patient of Dr. Andreia Dorantes and would like to see her again for consultation. Discussed with patient that Dr. Dorantes will be out on a scheduled leave for a number of months. Pt verbalized understanding, but would still like to see her, even if surgery is delayed until Dr. Dorantes returns. She is scheduled for consultation on 1/2/18.

## 2018-01-02 ENCOUNTER — ONCOLOGY VISIT (OUTPATIENT)
Dept: ONCOLOGY | Facility: CLINIC | Age: 38
End: 2018-01-02
Attending: FAMILY MEDICINE
Payer: COMMERCIAL

## 2018-01-02 VITALS
HEIGHT: 65 IN | RESPIRATION RATE: 16 BRPM | SYSTOLIC BLOOD PRESSURE: 121 MMHG | DIASTOLIC BLOOD PRESSURE: 81 MMHG | HEART RATE: 99 BPM | OXYGEN SATURATION: 95 % | TEMPERATURE: 95.9 F

## 2018-01-02 DIAGNOSIS — D24.2 INTRADUCTAL PAPILLOMA OF BREAST, LEFT: ICD-10-CM

## 2018-01-02 DIAGNOSIS — N64.89 PSEUDOANGIOMATOUS STROMAL HYPERPLASIA OF BREAST: Primary | ICD-10-CM

## 2018-01-02 DIAGNOSIS — D05.11 DUCTAL CARCINOMA IN SITU (DCIS) OF RIGHT BREAST: ICD-10-CM

## 2018-01-02 PROCEDURE — G0463 HOSPITAL OUTPT CLINIC VISIT: HCPCS | Mod: ZF

## 2018-01-02 RX ORDER — ACETAMINOPHEN 325 MG/1
975 TABLET ORAL ONCE
Status: CANCELLED | OUTPATIENT
Start: 2018-01-02 | End: 2018-01-02

## 2018-01-02 ASSESSMENT — PAIN SCALES - GENERAL: PAINLEVEL: NO PAIN (0)

## 2018-01-02 NOTE — PROGRESS NOTES
"FOLLOW-UP  Jan 2, 2018 April Cindy Shen is a 37 year old female who returns for follow-up for right breast DCIS and a new left breast lesion.    Treatment to date:  1. Right wire-localized lumpectomy for intraductal papilloma (9/28/2016) - final path showed 3 mm of DCIS within the papilloma  2. Adjuvant radiation to right breast (12/13/2016 to 1/4/2017)  3. Declined adjuvant endocrine therapy    HPI:    Since her last visit, she had recently followed up with Dr Delgado and has been undergoing high risk breast screening with MRI alternating with mammography.   She noted no masses in either breast, axilla, or neck. She denies any nipple discharge or nipple inversion.     MRI showed an enhancing mass in the posterior left breast just inferior to the nipple line measuring 1.9 x 0.8 x 1.2 cm.    A biopsy was performed and a clip was placed.  It showed pseudoangiomatous stromal hyperplasia and a <1 mm intraductal papilloma. No atypia was seen.    /81 (BP Location: Right arm, Patient Position: Chair, Cuff Size: Adult Regular)  Pulse 99  Temp 95.9  F (35.5  C) (Oral)  Resp 16  Ht 1.645 m (5' 4.75\")  SpO2 95%   Physical Exam   Constitutional: She appears well-developed and well-nourished.   Pulmonary/Chest: Right breast exhibits no inverted nipple, no mass, no nipple discharge, no skin change and no tenderness. Left breast exhibits no inverted nipple, no mass, no nipple discharge, no skin change and no tenderness. Breasts are symmetrical.       Lymphadenopathy:     She has no cervical adenopathy.        Right cervical: No superficial cervical, no deep cervical and no posterior cervical adenopathy present.       Left cervical: No superficial cervical, no deep cervical and no posterior cervical adenopathy present.     She has no axillary adenopathy.        Right axillary: No pectoral and no lateral adenopathy present.        Left axillary: No pectoral and no lateral adenopathy present.       Right: No " supraclavicular adenopathy present.        Left: No supraclavicular adenopathy present.   Skin: Skin is warm and dry.        INVESTIGATIONS:    Breast MRI (12/8/2017) showed:  Breast composition: Scattered fibroglandular tissue  Background parenchymal enhancement 1st post C image: Minimal on the right, moderate on the left  Findings: Breast conservation therapy changes on the right. Enhancing oval circumscribed mass in the posterior left breast just inferior to the nipple line measuring up to 1.9 x 0.8 x 1.2 cm (series 13 image 125). No suspicious lymphadenopathy.  Impression: BI-RADS CATEGORY: 4 - Suspicious Abnormality-Biopsy Should Be Considered.    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    She has been doing well without signs/symptoms of recurrence regarding the right breast DCIS.  On the left, we reviewed the natural history of PASH.  This is a benign lesion that does not warrant surgical excision.  It does not increase her subsequent risk of breast cancer. I suspect the 2 cm enhancing mass on MRI represents PASH and not the <1 mm papilloma.  However, the presence of this mass makes surveillance of the papilloma challenging.      The natural history of intraductal papilloma was discussed with the patient.  Intraductal papilloma is a benign proliferative lesion of the breast that can cause nipple discharge.  An excision following the core needle biopsy is recommended for diagnostic purposes  because there are a small percentage of intraductal papilloma that are upgraded to non-invasive or invasive cancer following excision.  Soraida Shen is familiar with this as this was the outcome of the right breast papilloma.      Because the lesion is not palpable, a wire-localized approach will be taken.  She would present on the day of surgery for an image-guided wire placement, followed by a surgical excision in the operating room.  The risks of a wire-localized lumpectomy were discussed with the patient, including the risks of bleeding, wound infection, wound dehiscence, and post-operative contour change to the breast.     All questions were answered.  She did seem to understand the above.  Soraida Shen elected to proceed with a left wire-localized lumpectomy.       Total time spent with the patient was 45 minutes, of which more than half was counseling.     PLAN:  1. LEFT wire-localized lumpectomy    Andreia Dorantes MD MSc [facility] FACS    Division of Surgical Oncology  [facility]

## 2018-01-02 NOTE — NURSING NOTE
"Oncology Rooming Note    January 2, 2018 12:15 PM   April Cindy Shen is a 37 year old female who presents for:    Chief Complaint   Patient presents with     Oncology Clinic Visit     Return: Papilloma      Initial Vitals: /81 (BP Location: Right arm, Patient Position: Chair, Cuff Size: Adult Regular)  Pulse 99  Temp 95.9  F (35.5  C) (Oral)  Resp 16  Ht 1.645 m (5' 4.75\")  SpO2 95% Estimated body mass index is 29.67 kg/(m^2) as calculated from the following:    Height as of 12/11/17: 1.645 m (5' 4.76\").    Weight as of 12/19/17: 80.3 kg (177 lb). There is no height or weight on file to calculate BSA.  No Pain (0) Comment: Data Unavailable   No LMP recorded. Patient is not currently having periods (Reason: IUD).  Allergies reviewed: Yes  Medications reviewed: Yes    Medications: Medication refills not needed today.  Pharmacy name entered into AppLift: [facility] - [address]    Clinical concerns: no new concerns, just frustrated with process of return procedure. Dr. Dorantes was NOT notified.    10 minutes for nursing intake (face to face time)     Xander Chiang CMA              "

## 2018-01-02 NOTE — LETTER
"2018      RE: Soraida Shen  [address]  [facility] [address]     2018    Bita Perez MD   [address] [address]    RE: Soraida Shen  (: 1980)    Dear Dr. Bita Perez:    Your patient was seen for evaluation in my office.  Please find a copy of my notes for your record and review.  If you have any further questions, please feel free to contact my office.   Thank you for your kind referral.    Sincerely,   Andreia Dorantes MD MSc [facility] FACS    ---     FOLLOW-UP  2018    Soraida Shen is a 37 year old female who returns for follow-up for right breast DCIS and a new left breast lesion.    Treatment to date:  1. Right wire-localized lumpectomy for intraductal papilloma (2016) - final path showed 3 mm of DCIS within the papilloma  2. Adjuvant radiation to right breast (2016 to 2017)  3. Declined adjuvant endocrine therapy    HPI:    Since her last visit, she had recently followed up with Dr Delgado and has been undergoing high risk breast screening with MRI alternating with mammography.   She noted no masses in either breast, axilla, or neck. She denies any nipple discharge or nipple inversion.     MRI showed an enhancing mass in the posterior left breast just inferior to the nipple line measuring 1.9 x 0.8 x 1.2 cm.    A biopsy was performed and a clip was placed.  It showed pseudoangiomatous stromal hyperplasia and a <1 mm intraductal papilloma. No atypia was seen.    /81 (BP Location: Right arm, Patient Position: Chair, Cuff Size: Adult Regular)  Pulse 99  Temp 95.9  F (35.5  C) (Oral)  Resp 16  Ht 1.645 m (5' 4.75\")  SpO2 95%   Physical Exam   Constitutional: She appears well-developed and well-nourished.   Pulmonary/Chest: Right breast exhibits no inverted nipple, no mass, no nipple discharge, no skin change and no tenderness. Left breast exhibits no inverted nipple, no mass, no nipple discharge, no skin " change and no tenderness. Breasts are symmetrical.       Lymphadenopathy:     She has no cervical adenopathy.        Right cervical: No superficial cervical, no deep cervical and no posterior cervical adenopathy present.       Left cervical: No superficial cervical, no deep cervical and no posterior cervical adenopathy present.     She has no axillary adenopathy.        Right axillary: No pectoral and no lateral adenopathy present.        Left axillary: No pectoral and no lateral adenopathy present.       Right: No supraclavicular adenopathy present.        Left: No supraclavicular adenopathy present.   Skin: Skin is warm and dry.        INVESTIGATIONS:    Breast MRI (12/8/2017) showed:  Breast composition: Scattered fibroglandular tissue  Background parenchymal enhancement 1st post C image: Minimal on the right, moderate on the left  Findings: Breast conservation therapy changes on the right. Enhancing oval circumscribed mass in the posterior left breast just inferior to the nipple line measuring up to 1.9 x 0.8 x 1.2 cm (series 13 image 125). No suspicious lymphadenopathy.  Impression: BI-RADS CATEGORY: 4 - Suspicious Abnormality-Biopsy Should Be Considered.    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    She has been doing well without signs/symptoms of recurrence regarding the right breast DCIS.  On the left, we reviewed the natural history of PASH.  This is a benign lesion that does not warrant  surgical excision.  It does not increase her subsequent risk of breast cancer. I suspect the 2 cm enhancing mass on MRI represents PASH and not the <1 mm papilloma.  However, the presence of this mass makes surveillance of the papilloma challenging.      The natural history of intraductal papilloma was discussed with the patient.  Intraductal papilloma is a benign proliferative lesion of the breast that can cause nipple discharge.  An excision following the core needle biopsy is recommended for diagnostic purposes because there are a small percentage of intraductal papilloma that are upgraded to non-invasive or invasive cancer following excision.  Soraida Shen is familiar with this as this was the outcome of the right breast papilloma.      Because the lesion is not palpable, a wire-localized approach will be taken.  She would present on the day of surgery for an image-guided wire placement, followed by a surgical excision in the operating room.  The risks of a wire-localized lumpectomy were discussed with the patient, including the risks of bleeding, wound infection, wound dehiscence, and post-operative contour change to the breast.     All questions were answered.  She did seem to understand the above.  Soraida Shen elected to proceed with a left wire-localized lumpectomy.       Total time spent with the patient was 45 minutes, of which more than half was counseling.     PLAN:  1. LEFT wire-localized lumpectomy    Andreia Dorantes MD MSc [facility] FACS    Division of Surgical Oncology  [facility]

## 2018-01-02 NOTE — LETTER
"2018      RE: Soraida Shen  [address]  [place] [address]     2018    RE: Soraida Shen  (: 1980)    Dear Dr. Delgado:    Your patient was seen for evaluation in my office.  Please find a copy of my notes for your record and review.  If you have any further questions, please feel free to contact my office.   Thank you.    Sincerely,   Andreia Dorantes MD MSc [facility] FACS    ---    FOLLOW-UP  2018    Soraida Shen is a 37 year old female who returns for follow-up for right breast DCIS and a new left breast lesion.    Treatment to date:  1. Right wire-localized lumpectomy for intraductal papilloma (2016) - final path showed 3 mm of DCIS within the papilloma  2. Adjuvant radiation to right breast (2016 to 2017)  3. Declined adjuvant endocrine therapy    HPI:    Since her last visit, she had recently followed up with Dr Delgado and has been undergoing high risk breast screening with MRI alternating with mammography.   She noted no masses in either breast, axilla, or neck. She denies any nipple discharge or nipple inversion.     MRI showed an enhancing mass in the posterior left breast just inferior to the nipple line measuring 1.9 x 0.8 x 1.2 cm.    A biopsy was performed and a clip was placed.  It showed pseudoangiomatous stromal hyperplasia and a <1 mm intraductal papilloma. No atypia was seen.    /81 (BP Location: Right arm, Patient Position: Chair, Cuff Size: Adult Regular)  Pulse 99  Temp 95.9  F (35.5  C) (Oral)  Resp 16  Ht 1.645 m (5' 4.75\")  SpO2 95%   Physical Exam   Constitutional: She appears well-developed and well-nourished.   Pulmonary/Chest: Right breast exhibits no inverted nipple, no mass, no nipple discharge, no skin change and no tenderness. Left breast exhibits no inverted nipple, no mass, no nipple discharge, no skin change and no tenderness. Breasts are symmetrical.       Lymphadenopathy:     She has no cervical adenopathy.        " Right cervical: No superficial cervical, no deep cervical and no posterior cervical adenopathy present.       Left cervical: No superficial cervical, no deep cervical and no posterior cervical adenopathy present.     She has no axillary adenopathy.        Right axillary: No pectoral and no lateral adenopathy present.        Left axillary: No pectoral and no lateral adenopathy present.       Right: No supraclavicular adenopathy present.        Left: No supraclavicular adenopathy present.   Skin: Skin is warm and dry.        INVESTIGATIONS:    Breast MRI (12/8/2017) showed:  Breast composition: Scattered fibroglandular tissue  Background parenchymal enhancement 1st post C image: Minimal on the right, moderate on the left  Findings: Breast conservation therapy changes on the right. Enhancing oval circumscribed mass in the posterior left breast just inferior to the nipple line measuring up to 1.9 x 0.8 x 1.2 cm (series 13 image 125). No suspicious lymphadenopathy.  Impression: BI-RADS CATEGORY: 4 - Suspicious Abnormality-Biopsy Should Be Considered.    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    She has been doing well without signs/symptoms of recurrence regarding the right breast DCIS.  On the left, we reviewed the natural history of PASH.  This is a benign lesion that does not warrant surgical excision.  It does not increase her subsequent risk of breast cancer. I suspect the 2 cm enhancing mass on MRI  represents PASH and not the <1 mm papilloma.  However, the presence of this mass makes surveillance of the papilloma challenging.      The natural history of intraductal papilloma was discussed with the patient.  Intraductal papilloma is a benign proliferative lesion of the breast that can cause nipple discharge.  An excision following the core needle biopsy is recommended for diagnostic purposes because there are a small percentage of intraductal papilloma that are upgraded to non-invasive or invasive cancer following excision.  Soraida Shen is familiar with this as this was the outcome of the right breast papilloma.      Because the lesion is not palpable, a wire-localized approach will be taken.  She would present on the day of surgery for an image-guided wire placement, followed by a surgical excision in the operating room.  The risks of a wire-localized lumpectomy were discussed with the patient, including the risks of bleeding, wound infection, wound dehiscence, and post-operative contour change to the breast.     All questions were answered.  She did seem to understand the above.  Soraida Shen elected to proceed with a left wire-localized lumpectomy.       Total time spent with the patient was 45 minutes, of which more than half was counseling.     PLAN:  1. LEFT wire-localized lumpectomy    Andreia Dorantes MD MSc [facility] FACS    Division of Surgical Oncology  [facility]

## 2018-01-02 NOTE — MR AVS SNAPSHOT
"              After Visit Summary   1/2/2018 April Cindy Shen    MRN: [id number]           Patient Information     Date Of Birth          1980        Visit Information        Provider Department      1/2/2018 12:00 PM Andreia Dorantes MD [facility]        Today's Diagnoses     Pseudoangiomatous stromal hyperplasia of breast    -  1    Intraductal papilloma of breast, left        Ductal carcinoma in situ (DCIS) of right breast           Follow-ups after your visit        Your next 10 appointments already scheduled     Jul 02, 2018  1:15 PM CDT   (Arrive by 1:00 PM)   MA SCREENING BILATERAL W/ TEJAL with [id number]   [facility] ([facility] [place])    [address] [place] [id number] [phone]           Three-dimensional (3D) mammograms are available at [facility] locations in [facility], [place], [place], [facility], [place], [place], and [place]. [facility] locations include [place] and [facility] in [place]. Benefits of 3D mammograms include: - Improved rate of cancer detection - Decreases your chance of having to go back for more tests, which means fewer: - \"False-positive\" results (This means that there is an abnormal area but it isn't cancer.) - Invasive testing procedures, such as a biopsy or surgery - Can provide clearer images of the breast if you have dense breast tissue. 3D mammography is an optional exam that anyone can have with a 2D mammogram. It doesn't replace or take the place of a 2D mammogram. 2D mammograms remain an effective screening test for all women.  Not all insurance companies cover the cost of a 3D mammogram. Check with your insurance.            Jul 02, 2018  2:15 PM CDT   (Arrive by 2:00 PM)   Return Visit with Valerie Delgado MD   [facility] ([facility] [place])    [address] [place] [address] " "[id number] [phone]              Future tests that were ordered for you today     Open Future Orders        Priority Expected Expires Ordered    MA Breast Wire Placement, 1St Lesion Left Routine  1/3/2019 1/2/2018    MA Breast Specimen Left Routine  1/3/2019 1/2/2018            Who to contact     If you have questions or need follow up information about today's clinic visit or your schedule please contact [facility] directly at [phone].  Normal or non-critical lab and imaging results will be communicated to you by [employer], letter or phone within 4 business days after the clinic has received the results. If you do not hear from us within 7 days, please contact the clinic through [employer] or phone. If you have a critical or abnormal lab result, we will notify you by phone as soon as possible.  Submit refill requests through [employer] or call your pharmacy and they will forward the refill request to us. Please allow 3 business days for your refill to be completed.          Additional Information About Your Visit        [employer] Information     [employer] gives you secure access to your electronic health record. If you see a primary care provider, you can also send messages to your care team and make appointments. If you have questions, please call your primary care clinic.  If you do not have a primary care provider, please call [phone] and they will assist you.        Care EveryWhere ID     This is your Care EveryWhere ID. This could be used by other organizations to access your [place] medical records  [id number]        Your Vitals Were     Pulse Temperature Respirations Height Pulse Oximetry       99 95.9  F (35.5  C) (Oral) 16 1.645 m (5' 4.75\") 95%        Blood Pressure from Last 3 Encounters:   01/02/18 121/81   12/19/17 117/75   12/11/17 116/80    Weight from Last 3 Encounters:   12/19/17 80.3 kg (177 lb)   12/11/17 80.9 kg (178 lb 4.8 oz)   09/12/17 81.3 kg (179 lb 3.2 oz)            "   We Performed the Following     Jil-Operative Worksheet (Breast Center - Plastics)        Primary Care Provider Office Phone # Fax #    Bita Perez [phone]       [address] [address]        Equal Access to Services     SARAHMELVIN LAUREN : Hadii aad ku hadmaxabbi Soelise, waaxda luqadaha, qaybta kaalmada adeegyada, waxay idiin haymelagovind riveraladanjareth tam. So [facility] [phone].    ATENCIÓN: Si habla español, tiene a alfred disposición servicios gratuitos de asistencia lingüística. Angel al [phone].    We comply with applicable federal civil rights laws and [place] laws. We do not discriminate on the basis of race, color, national origin, age, disability, sex, sexual orientation, or gender identity.            Thank you!     Thank you for choosing [facility]  for your care. Our goal is always to provide you with excellent care. Hearing back from our patients is one way we can continue to improve our services. Please take a few minutes to complete the written survey that you may receive in the mail after your visit with us. Thank you!             Your Updated Medication List - Protect others around you: Learn how to safely use, store and throw away your medicines at www.disposemymeds.org.          This list is accurate as of: 1/2/18  1:19 PM.  Always use your most recent med list.                   Brand Name Dispense Instructions for use Diagnosis    buPROPion 300 MG 24 hr tablet    WELLBUTRIN XL    60 tablet    Take 1 tablet (300 mg) by mouth every morning See primary care doctor for additional refills.    Adjustment disorder with depressed mood       cabergoline 0.5 MG tablet    DOSTINEX    12 tablet    Take 0.5 tablets (0.25 mg) by mouth twice a week    Pituitary tumor       LORazepam 1 MG tablet    ATIVAN    5 tablet    Take 1 tablet (1 mg) by mouth every 8 hours as needed for anxiety (for flying)    Fear of flying

## 2018-01-04 ENCOUNTER — ANESTHESIA EVENT (OUTPATIENT)
Dept: SURGERY | Facility: AMBULATORY SURGERY CENTER | Age: 38
End: 2018-01-04

## 2018-01-05 ENCOUNTER — RADIANT APPOINTMENT (OUTPATIENT)
Dept: MAMMOGRAPHY | Facility: CLINIC | Age: 38
End: 2018-01-05
Attending: SURGERY
Payer: COMMERCIAL

## 2018-01-05 ENCOUNTER — HOSPITAL ENCOUNTER (OUTPATIENT)
Facility: AMBULATORY SURGERY CENTER | Age: 38
End: 2018-01-05
Attending: SURGERY
Payer: COMMERCIAL

## 2018-01-05 ENCOUNTER — SURGERY (OUTPATIENT)
Age: 38
End: 2018-01-05

## 2018-01-05 ENCOUNTER — ANESTHESIA (OUTPATIENT)
Dept: SURGERY | Facility: AMBULATORY SURGERY CENTER | Age: 38
End: 2018-01-05

## 2018-01-05 VITALS
OXYGEN SATURATION: 93 % | SYSTOLIC BLOOD PRESSURE: 96 MMHG | DIASTOLIC BLOOD PRESSURE: 59 MMHG | RESPIRATION RATE: 16 BRPM | TEMPERATURE: 97.7 F

## 2018-01-05 DIAGNOSIS — N64.89 PSEUDOANGIOMATOUS STROMAL HYPERPLASIA OF BREAST: ICD-10-CM

## 2018-01-05 DIAGNOSIS — D24.2 INTRADUCTAL PAPILLOMA OF BREAST, LEFT: ICD-10-CM

## 2018-01-05 DIAGNOSIS — Z98.890 S/P LUMPECTOMY OF BREAST: Primary | ICD-10-CM

## 2018-01-05 RX ORDER — FENTANYL CITRATE 50 UG/ML
25-50 INJECTION, SOLUTION INTRAMUSCULAR; INTRAVENOUS
Status: DISCONTINUED | OUTPATIENT
Start: 2018-01-05 | End: 2018-01-05 | Stop reason: HOSPADM

## 2018-01-05 RX ORDER — SODIUM CHLORIDE, SODIUM LACTATE, POTASSIUM CHLORIDE, CALCIUM CHLORIDE 600; 310; 30; 20 MG/100ML; MG/100ML; MG/100ML; MG/100ML
INJECTION, SOLUTION INTRAVENOUS CONTINUOUS
Status: DISCONTINUED | OUTPATIENT
Start: 2018-01-05 | End: 2018-01-05 | Stop reason: HOSPADM

## 2018-01-05 RX ORDER — FLUMAZENIL 0.1 MG/ML
0.2 INJECTION, SOLUTION INTRAVENOUS
Status: DISCONTINUED | OUTPATIENT
Start: 2018-01-05 | End: 2018-01-05 | Stop reason: HOSPADM

## 2018-01-05 RX ORDER — LIDOCAINE HYDROCHLORIDE 10 MG/ML
10 INJECTION, SOLUTION EPIDURAL; INFILTRATION; INTRACAUDAL; PERINEURAL ONCE
Status: COMPLETED | OUTPATIENT
Start: 2018-01-05 | End: 2018-01-05

## 2018-01-05 RX ORDER — SENNOSIDES A AND B 8.6 MG/1
1 TABLET, FILM COATED ORAL DAILY
Qty: 50 TABLET | Refills: 0 | Status: SHIPPED | OUTPATIENT
Start: 2018-01-05 | End: 2021-06-20

## 2018-01-05 RX ORDER — MEPERIDINE HYDROCHLORIDE 25 MG/ML
12.5 INJECTION INTRAMUSCULAR; INTRAVENOUS; SUBCUTANEOUS
Status: DISCONTINUED | OUTPATIENT
Start: 2018-01-05 | End: 2018-01-06 | Stop reason: HOSPADM

## 2018-01-05 RX ORDER — ACETAMINOPHEN 325 MG/1
650 TABLET ORAL
Status: DISCONTINUED | OUTPATIENT
Start: 2018-01-05 | End: 2018-01-06 | Stop reason: HOSPADM

## 2018-01-05 RX ORDER — HYDROCODONE BITARTRATE AND ACETAMINOPHEN 5; 325 MG/1; MG/1
1-2 TABLET ORAL EVERY 6 HOURS PRN
Qty: 15 TABLET | Refills: 0 | Status: SHIPPED | OUTPATIENT
Start: 2018-01-05 | End: 2021-06-20

## 2018-01-05 RX ORDER — ACETAMINOPHEN 325 MG/1
975 TABLET ORAL ONCE
Status: DISCONTINUED | OUTPATIENT
Start: 2018-01-05 | End: 2018-01-05 | Stop reason: HOSPADM

## 2018-01-05 RX ORDER — NALOXONE HYDROCHLORIDE 0.4 MG/ML
.1-.4 INJECTION, SOLUTION INTRAMUSCULAR; INTRAVENOUS; SUBCUTANEOUS
Status: DISCONTINUED | OUTPATIENT
Start: 2018-01-05 | End: 2018-01-05 | Stop reason: HOSPADM

## 2018-01-05 RX ORDER — ONDANSETRON 4 MG/1
4 TABLET, ORALLY DISINTEGRATING ORAL EVERY 30 MIN PRN
Status: DISCONTINUED | OUTPATIENT
Start: 2018-01-05 | End: 2018-01-06 | Stop reason: HOSPADM

## 2018-01-05 RX ORDER — ONDANSETRON 2 MG/ML
4 INJECTION INTRAMUSCULAR; INTRAVENOUS EVERY 30 MIN PRN
Status: DISCONTINUED | OUTPATIENT
Start: 2018-01-05 | End: 2018-01-06 | Stop reason: HOSPADM

## 2018-01-05 RX ORDER — GABAPENTIN 300 MG/1
300 CAPSULE ORAL ONCE
Status: COMPLETED | OUTPATIENT
Start: 2018-01-05 | End: 2018-01-05

## 2018-01-05 RX ORDER — PROPOFOL 10 MG/ML
INJECTION, EMULSION INTRAVENOUS CONTINUOUS PRN
Status: DISCONTINUED | OUTPATIENT
Start: 2018-01-05 | End: 2018-01-05

## 2018-01-05 RX ORDER — FENTANYL CITRATE 50 UG/ML
INJECTION, SOLUTION INTRAMUSCULAR; INTRAVENOUS PRN
Status: DISCONTINUED | OUTPATIENT
Start: 2018-01-05 | End: 2018-01-05

## 2018-01-05 RX ORDER — ONDANSETRON 4 MG/1
4 TABLET, ORALLY DISINTEGRATING ORAL
Status: DISCONTINUED | OUTPATIENT
Start: 2018-01-05 | End: 2018-01-06 | Stop reason: HOSPADM

## 2018-01-05 RX ORDER — BUPIVACAINE HYDROCHLORIDE 2.5 MG/ML
INJECTION, SOLUTION EPIDURAL; INFILTRATION; INTRACAUDAL PRN
Status: DISCONTINUED | OUTPATIENT
Start: 2018-01-05 | End: 2018-01-05

## 2018-01-05 RX ORDER — BUPIVACAINE HYDROCHLORIDE 2.5 MG/ML
INJECTION, SOLUTION INFILTRATION; PERINEURAL PRN
Status: DISCONTINUED | OUTPATIENT
Start: 2018-01-05 | End: 2018-01-05 | Stop reason: HOSPADM

## 2018-01-05 RX ORDER — ACETAMINOPHEN 325 MG/1
975 TABLET ORAL ONCE
Status: COMPLETED | OUTPATIENT
Start: 2018-01-05 | End: 2018-01-05

## 2018-01-05 RX ORDER — ONDANSETRON 2 MG/ML
INJECTION INTRAMUSCULAR; INTRAVENOUS PRN
Status: DISCONTINUED | OUTPATIENT
Start: 2018-01-05 | End: 2018-01-05

## 2018-01-05 RX ORDER — PROMETHAZINE HYDROCHLORIDE 25 MG/ML
12.5 INJECTION, SOLUTION INTRAMUSCULAR; INTRAVENOUS
Status: DISCONTINUED | OUTPATIENT
Start: 2018-01-05 | End: 2018-01-06 | Stop reason: HOSPADM

## 2018-01-05 RX ORDER — SODIUM CHLORIDE, SODIUM LACTATE, POTASSIUM CHLORIDE, CALCIUM CHLORIDE 600; 310; 30; 20 MG/100ML; MG/100ML; MG/100ML; MG/100ML
INJECTION, SOLUTION INTRAVENOUS CONTINUOUS
Status: DISCONTINUED | OUTPATIENT
Start: 2018-01-05 | End: 2018-01-06 | Stop reason: HOSPADM

## 2018-01-05 RX ORDER — LIDOCAINE 40 MG/G
CREAM TOPICAL
Status: DISCONTINUED | OUTPATIENT
Start: 2018-01-05 | End: 2018-01-05 | Stop reason: HOSPADM

## 2018-01-05 RX ORDER — OXYCODONE HYDROCHLORIDE 5 MG/1
5 TABLET ORAL
Status: COMPLETED | OUTPATIENT
Start: 2018-01-05 | End: 2018-01-05

## 2018-01-05 RX ORDER — NALOXONE HYDROCHLORIDE 0.4 MG/ML
.1-.4 INJECTION, SOLUTION INTRAMUSCULAR; INTRAVENOUS; SUBCUTANEOUS
Status: DISCONTINUED | OUTPATIENT
Start: 2018-01-05 | End: 2018-01-06 | Stop reason: HOSPADM

## 2018-01-05 RX ADMIN — FENTANYL CITRATE 25 MCG: 50 INJECTION, SOLUTION INTRAMUSCULAR; INTRAVENOUS at 14:10

## 2018-01-05 RX ADMIN — BUPIVACAINE HYDROCHLORIDE 6 ML: 2.5 INJECTION, SOLUTION INFILTRATION; PERINEURAL at 15:32

## 2018-01-05 RX ADMIN — LIDOCAINE HYDROCHLORIDE 100 MG: 10 INJECTION, SOLUTION EPIDURAL; INFILTRATION; INTRACAUDAL; PERINEURAL at 13:57

## 2018-01-05 RX ADMIN — BUPIVACAINE HYDROCHLORIDE 20 ML: 2.5 INJECTION, SOLUTION EPIDURAL; INFILTRATION; INTRACAUDAL at 14:01

## 2018-01-05 RX ADMIN — OXYCODONE HYDROCHLORIDE 5 MG: 5 TABLET ORAL at 16:02

## 2018-01-05 RX ADMIN — GABAPENTIN 300 MG: 300 CAPSULE ORAL at 12:42

## 2018-01-05 RX ADMIN — PROPOFOL: 10 INJECTION, EMULSION INTRAVENOUS at 15:13

## 2018-01-05 RX ADMIN — FENTANYL CITRATE 25 MCG: 50 INJECTION, SOLUTION INTRAMUSCULAR; INTRAVENOUS at 14:47

## 2018-01-05 RX ADMIN — ACETAMINOPHEN 975 MG: 325 TABLET ORAL at 12:41

## 2018-01-05 RX ADMIN — FENTANYL CITRATE 25 MCG: 50 INJECTION, SOLUTION INTRAMUSCULAR; INTRAVENOUS at 14:42

## 2018-01-05 RX ADMIN — SODIUM CHLORIDE, SODIUM LACTATE, POTASSIUM CHLORIDE, CALCIUM CHLORIDE: 600; 310; 30; 20 INJECTION, SOLUTION INTRAVENOUS at 12:42

## 2018-01-05 RX ADMIN — PROPOFOL 150 MCG/KG/MIN: 10 INJECTION, EMULSION INTRAVENOUS at 14:25

## 2018-01-05 RX ADMIN — FENTANYL CITRATE 25 MCG: 50 INJECTION, SOLUTION INTRAMUSCULAR; INTRAVENOUS at 14:34

## 2018-01-05 RX ADMIN — FENTANYL CITRATE 25 MCG: 50 INJECTION, SOLUTION INTRAMUSCULAR; INTRAVENOUS at 14:09

## 2018-01-05 RX ADMIN — FENTANYL CITRATE 25 MCG: 50 INJECTION, SOLUTION INTRAMUSCULAR; INTRAVENOUS at 14:27

## 2018-01-05 RX ADMIN — ONDANSETRON 4 MG: 2 INJECTION INTRAMUSCULAR; INTRAVENOUS at 14:32

## 2018-01-05 NOTE — ANESTHESIA POSTPROCEDURE EVALUATION
Patient: April Cindy Shen    Procedure(s):  Left Wire Localized Lumpectomy - Wound Class: I-Clean    Diagnosis:Intraductal Papilloma  Diagnosis Additional Information: No value filed.    Anesthesia Type:  MAC, Periph. Nerve Block for postop pain    Note:  Anesthesia Post Evaluation    Patient location during evaluation: bedside  Patient participation: Able to fully participate in evaluation  Level of consciousness: awake  Pain management: adequate  Airway patency: patent  Cardiovascular status: acceptable  Respiratory status: acceptable  Hydration status: acceptable  PONV: controlled     Anesthetic complications: None          Last vitals:  Vitals:    01/05/18 1410 01/05/18 1415 01/05/18 1545   BP: 108/60 118/73 114/68   Resp: 23 20 16   Temp:   36.5  C (97.7  F)   SpO2: 94% 96% 99%         Electronically Signed By: Odin Cruz MD, MD  January 5, 2018  4:02 PM

## 2018-01-05 NOTE — ANESTHESIA CARE TRANSFER NOTE
Patient: April Cindy Shen    Procedure(s):  Left Wire Localized Lumpectomy - Wound Class: I-Clean    Diagnosis: Intraductal Papilloma  Diagnosis Additional Information: No value filed.    Anesthesia Type:   MAC, Periph. Nerve Block for postop pain     Note:  Airway :Room Air          Vitals: (Last set prior to Anesthesia Care Transfer)    CRNA VITALS  1/5/2018 1514 - 1/5/2018 1549      1/5/2018             Resp Rate (observed): (!)  1    Resp Rate (set): 10                Electronically Signed By: VY Werner CRNA  January 5, 2018  3:49 PM

## 2018-01-05 NOTE — PROGRESS NOTES
SBAR Wire Localization  Patient to [facility] for imaging guided wire localizations without sentinel node injection.    SITUATION:  Patient to [facility] for imaging guided wire localizations before breast lumpectomy or excision biopsy without sentinel node injection.    BACKGROUND:  Breast cancer, breast abnormality  Ordered procedure completed: Yes  Special needs identified: No     ASSESSMENT:  SBAR report called to patient care unit because of unexpected event in radiology: No  Allergies and medication list reviewed prior to procedure. Yes  Pre procedure pain level: 0/10 stated.   Skin cleansed with ChloraPrep One-Step.  Anesthesia: approximately 5cc of 1% Lidocaine injection with bicarbonate buffer added subcutaneous before wire insertion administered by the radiologist.  Gauze dressing over insertion site(s).  Post procedure mammogram completed: Yes  Post procedure pain level: 1/10 stated.   Patient tolerence:   Summary: Uncomplicated wire localization    RECOMMENDATIONS:  Patient transferred to surgery in stable condition via wheelchair by transport.   Two Copies of note given to patient and instructions to hand this note to nuclear medicine staff and surgical staff.    Please call Tami Howe at [phone] (inside line) if questions.

## 2018-01-05 NOTE — OP NOTE
DATE OF SURGERY: January 5, 2018     SURGEON: Andreia Dorantes MD  ASSISTANT: Rand Thompson MD PGY-3    PREOPERATIVE DIAGNOSIS: Left breast intraductal papilloma, 6:00  POSTOPERATIVE DIAGNOSIS: same    PROCEDURE: Left wire-localized lumpectomy    ANESTHESIA: General + Block    CLINICAL NOTE:  Soraida Shen is a 37 year old woman with an intraductal papilloma in the background of [facility] and a 2 cm area of abnormality on breast MRI.  The risks and benefits of a left wire-localized lumpectomy were discussed with the patient and she elected to proceed with informed consent.    OPERATIVE FINDINGS:  1. Specimen radiograph confirmed presence of the wire and biopsy clip within the specimen.     OPERATIVE PROCEDURE:  The patient first presented to the [facility] for the wire-localization by the radiologist.  The wire-localization images were personally reviewed by me prior to the start of the procedure.  I spoke with the radiologist, who confirmed that the clip was located lateral to the 6:00 lesion in question; thus, he had placed the wire coming from the lower outer quadrant to ensure we would go around the clip and the lesion. The patient was brought to the operating room and placed in the supine position with appropriate padding for all of the pressure points. MAC was provided by the anesthesiology team.   A surgical safety checklist was performed to confirm the patient's identity, the site and laterality of the procedure. Perioperative antibiotics (Ancef) was provided.  VTE prophylaxis was provided with serial compression devices. The left breast was then prepped and draped in the usual sterile fashion using Chloraprep.     A radial incision was made in the lower outer left breast, given that the wire came into the skin in the lower outer quadrant.  The breast tissue denoted by the localizing wire was dissected out.  Medially, at around 6:00, I could see evidence of a biopsy cavity, with an associated pink lesion  within the cavity.  This was included with the specimen. This area was marked with a surgical clip in the surgical cavity.  The specimen was inked and radiographed.  It was found to contain the biopsy clip and wire.  The specimen was then sent to pathology.   The wound was irrigated and hemostasis was achieved.  Deep sutures were placed to avoid a divot in the cavity.  6 mL of 0.25% marcaine without epinephrine was infiltrated into the surgical site.  The incision was closed in two layers with interrupted 3-0 vicryl and a running subcuticular 4-0 monocryl.  Steristrips and a dressing were applied.   The patient tolerated the procedure well. The sponge, needle, instrument counts were correct.  The patient was then awakened and taken to recovery in stable fashion.      I was present and scrubbed for the entire above procedure.    EBL: Nil    SPECIMEN(S):  1. Left breast mass    POSTOPERATIVE PLANS:  April Cindy Shen will be discharged home today with wound care instructions and a prescription for analgesics.  She will follow-up with me in 2 weeks.     Andreia Dorantes MD MSc [facility] FACS    Division of Surgical Oncology  [facility]

## 2018-01-05 NOTE — DISCHARGE INSTRUCTIONS
"[facility]  Home Care Following Anesthesia  For 24 hours after surgery:  1. Get plenty of rest.  A responsible adult must stay with you for at least 24 hours after you leave the surgery center.  2. Do not drive or use heavy equipment.  If you have weakness or tingling, don't drive or use heavy equipment until this feeling goes away.   3. Do not drink alcohol.   4. Avoid strenuous or risky activities.  Ask for help when climbing stairs.  5. You may feel lightheaded.  IF so, sit for a few minutes before standing.  Have someone help you get up.   6. If you have nausea (feel sick to your stomach): Drink only clear liquids such as apple juice, ginger ale, broth or 7-Up.  Rest may also help.  Be sure to drink enough fluids.  Move to a regular diet as you feel able.   7. You may have a slight fever.  Call the doctor if your fever is over 100 F (37.7 C) (taken under the tongue) or lasts longer than 24 hours.  8. You may have a dry mouth, a sore throat, muscle aches or trouble sleeping. These should go away after 24 hours.  9. Do not make important or legal decisions.        Today you received an Exparel block to numb the nerves near your surgery site.  This is a block using local anesthetic or \"numbing\" medication injected around the nerves to anesthetize or \"numb\" the area supplied by those nerves.  This block is injected into the muscle layer near your surgical site.  This medication may numb the location where you had surgery up to 72 hours.  If your surgical site is an arm or leg you should be careful with your affected limb, since it is possible to injure your limb without being aware of it due to the numbing.  Until full feeling returns, you should guard against bumping or hitting your limb, and avoid extreme hot or cold temperatures on the skin.  As the block wears off, the feeling will return as a tingling or prickly sensation near your surgical site.  You will experince more " discomfort from your incision as the feeling returns.  You may want to take a pain pill (a narcotic or Tylenol if this was prescribed by your surgeon) when you start to experience mild pain before the pain beomes more severe.  If your pain medications do not control your pain, you should notify your surgeon.    Tips for taking pain medications  To get the best pain relief possible, remember these points:    Take pain medications as directed, before pain becomes severe.    Pain medication can upset your stomach: taking it with food may help.    Constipation is a common side effect of pain medication. Drink plenty of  fluids.    Eat foods high in fiber. Take a stool softener if recommended by your doctor or pharmacist.    Do not drink alcohol, drive or operate machinery while taking pain medications.    Ask about other ways to control pain, such as with heat, ice or relaxation.    Tylenol/Acetaminophen Consumption  To help encourage the safe use of acetaminophen, the makers of TYLENOL  have lowered the maximum daily dose for single-ingredient Extra Strength TYLENOL  (acetaminophen) products sold in the [place] from 8 pills per day (4,000 mg) to 6 pills per day (3,000 mg). The dosing interval has also changed from 2 pills every 4-6 hours to 2 pills every 6 hours.    If you feel your pain relief is insufficient, you may take Tylenol/Acetaminophen in addition to your narcotic pain medication.     Be careful not to exceed 3,000 mg of Tylenol/Acetaminophen in a 24 hour period from all sources.    If you are taking extra strength Tylenol/acetaminophen (500 mg), the maximum dose is 6 tablets in 24 hours.    If you are taking regular strength acetaminophen (325 mg), the maximum dose is 9 tablets in 24 hours.    Call a doctor for any of the followin. Signs of infection (fever, growing tenderness at the surgery site, a large amount of drainage or bleeding, severe pain, foul-smelling drainage, redness, swelling).  2. It has  been over 8 to 10 hours since surgery and you are still not able to urinate (pass water).  3. Headache for over 24 hours.    Your doctor is:  Dr. Andreia Dorantes, [facility]: [phone]                  Or dial [phone] and ask for the resident on call for:  [facility]  For emergency care, call the:  [facility]:  [phone] (TTY for hearing impaired: [phone])  Post Operative Instructions: Regional Anesthetic for Upper Extremity    General Information:   Regional anesthesia is when local anesthetic or  numbing  medication is injected around the nerves to anesthetize or  numb  the area supplied by that set of nerves.      Types of Regional Blocks:  Interscalene: A block injected into the neck on the operative shoulder/arm of a patient having shoulder surgery  Supraclavicular: A block injected near the clavicle on the operative shoulder of a patient having elbow, forearm, or hand surgery    Procedure:  The type of anesthesia your doctor used to numb your shoulder or arm will usually not wear off for 6-18 hours, but may last as long as 24 hours. You should be careful during that period, since it is possible to injure your arm without being aware of the injury. While your arm is numb, you should:    Avoid striking or bumping your arm    Avoid extreme hot or cold    Diet:  There are no restrictions on your diet. You should drink plenty of fluids.     Discomfort:  You will have a tingling and prickly sensation in your arm as the feeling begins to return. You can also expect some discomfort. The amount of discomfort is unpredictable, but if you have more pain than can be controlled with pain medication you should notify your physician.     Pain Medicine:   Begin taking your oral pain pills (if you have not already done so) before bedtime and during the night to avoid a sudden onset of pain as the block wears off.  Do not engage in drinking, driving, or hazardous occupations while taking pain  medication.     Stitches:   You may have stitches or special skin closures. You doctor will inform you when to return to the office to have them removed.     Activity:  On the day of surgery you should try to stay in bed with your hand elevated on pillows. You may resume your normal activity after that, wearing a sling for comfort. Contact your physician if you have any of the problems:     Continued numbness or tingling in the arm or hand after 24 hours    Swelling of the fingers or fingers that are cold to the touch    Excessive bleeding or drainage    Severe pain

## 2018-01-05 NOTE — ANESTHESIA PREPROCEDURE EVALUATION
Anesthesia Evaluation     . Pt has had prior anesthetic. Type: MAC    No history of anesthetic complications          ROS/MED HX    ENT/Pulmonary:  - neg pulmonary ROS     Neurologic:  - neg neurologic ROS     Cardiovascular:  - neg cardiovascular ROS       METS/Exercise Tolerance:  4 - Raking leaves, gardening   Hematologic:  - neg hematologic  ROS       Musculoskeletal:  - neg musculoskeletal ROS       GI/Hepatic:  - neg GI/hepatic ROS       Renal/Genitourinary:  - ROS Renal section negative       Endo:  - neg endo ROS       Psychiatric:     (+) psychiatric history depression      Infectious Disease:  - neg infectious disease ROS       Malignancy:         Other:                     Physical Exam  Normal systems: dental    Airway   Mallampati: I  TM distance: >3 FB  Neck ROM: full    Dental     Cardiovascular   Rhythm and rate: regular and normal      Pulmonary    breath sounds clear to auscultation                    Anesthesia Plan      History & Physical Review      ASA Status:  1 .    NPO Status:  > 6 hours    Plan for MAC and Periph. Nerve Block for postop pain with Intravenous induction. Maintenance will be TIVA.    PONV prophylaxis:  Ondansetron (or other 5HT-3) and Dexamethasone or Solumedrol       Postoperative Care  Postoperative pain management:  Oral pain medications, Multi-modal analgesia and Peripheral nerve block (Single Shot).      Consents  Anesthetic plan, risks, benefits and alternatives discussed with:  Patient..                          .

## 2018-01-05 NOTE — BRIEF OP NOTE
[facility]    Brief Operative Note    Pre-operative diagnosis: Intraductal Papilloma  Post-operative diagnosis Intraductal Papilloma    Procedure(s):  Left Wire Localized Lumpectomy - Wound Class: I-Clean    Surgeon(s) and Role:     * Andreia Dorantes MD - Primary   Erika Thompson MD PGY-3 Assiting    Anesthesia: Monitor Anesthesia Care   Estimated blood loss: Minimal  Drains: None    Specimens:   ID Type Source Tests Collected by Time Destination   A : Left Breast Mass Tissue Breast, Left SURGICAL PATHOLOGY EXAM Andreia Dorantes MD 1/5/2018  3:14 PM      Findings:   None.  Complications: None.  Implants: None.

## 2018-01-05 NOTE — IP AVS SNAPSHOT
[facility]    [address] [place] [id number]    Phone:  [phone]    Fax:  [phone]                                       After Visit Summary   1/5/2018    Soraida Shen    MRN: [id number]           After Visit Summary Signature Page     I have received my discharge instructions, and my questions have been answered. I have discussed any challenges I see with this plan with the nurse or doctor.    ..........................................................................................................................................  Patient/Patient Representative Signature      ..........................................................................................................................................  Patient Representative Print Name and Relationship to Patient    ..................................................               ................................................  Date                                            Time    ..........................................................................................................................................  Reviewed by Signature/Title    ...................................................              ..............................................  Date                                                            Time

## 2018-01-05 NOTE — ANESTHESIA PROCEDURE NOTES
Peripheral Nerve Block Procedure Note    Staff:     Anesthesiologist:  NIDA ORTIZ    Referred By:  MILO CORREIA  Location: Pre-op  Procedure Start/Stop TImes:      1/5/2018 2:10 PM     1/5/2018 2:19 PM    patient identified, IV checked, site marked, risks and benefits discussed, informed consent, monitors and equipment checked, pre-op evaluation, at physician/surgeon's request and post-op pain management      Correct Patient: Yes      Correct Position: Yes      Correct Site: Yes      Correct Procedure: Yes      Correct Laterality:  Yes    Site Marked:  Yes  Procedure details:     Procedure:  Pectoralis    ASA:  1    Laterality:  Left    Position:  Supine    Sterile Prep: chloraprep, mask and sterile gloves      Needle:  Insulated and short bevel    Needle gauge:  21    Needle length (inches):  4    Ultrasound: Yes      Ultrasound used to identify targeted nerve, plexus, or vascular structure and placed a needle adjacent to it      Permanent Image entered into patiient's record      Abnormal pain on injection: No      Blood Aspirated: No      Paresthesias:  No    Bleeding at site: No      Bolus via:  Needle    Infusion Method:  Single Shot    Complications:  None

## 2018-01-05 NOTE — IP AVS SNAPSHOT
MRN:[id number]                      After Visit Summary   1/5/2018    Soraida Shen    MRN: [id number]           Thank you!     Thank you for choosing [facility] for your care. Our goal is always to provide you with excellent care. Hearing back from our patients is one way we can continue to improve our services. Please take a few minutes to complete the written survey that you may receive in the mail after you visit with us. Thank you!        Patient Information     Date Of Birth          1980        About your hospital stay     You were admitted on:  January 5, 2018 [facility]    You were discharged on:  January 5, 2018       Who to Call     For medical emergencies, please call 911.  For non-urgent questions about your medical care, please call your primary care provider or clinic, [phone]  For questions related to your surgery, please call your surgery clinic        Attending Provider     Provider Andreia Castorena MD Surgery       Primary Care Provider Office Phone # Fax #    Bita Perez [phone]      After Care Instructions     Discharge Instructions       From Dr Dorantes:    1. Patient to follow up with appointment in 2 weeks. Your pathology report will be reviewed with you in person in clinic.  2. Do not drive while taking narcotic pain medication (Norco).   3. May take plain Tylenol as needed for pain. Do not take at the same time as Norco as you can overdose on acetaminophen.   4. Avoid non-steroidal anti-inflammatory medications (Advil, Ibuprofen, Naproxen, aspirin, etc) for 5-7 days.   5. Caution with putting ice on the incision as it will be numb you will not realize it if you leave the ice for too long and can damage your skin.  6. If you develop any fever/chills, worsening pain, redness, swelling, or drainage from your wound please call the clinic (Monday through Friday  "8:00am-5:00pm [phone] Dea HOFF) or on-call surgical oncology resident (nights and weekends [phone] and ask \"I would like to page the Surgical Oncology Resident on call.\")   7. No lifting over 15 lbs and no strenuous physical activity for 2 weeks.  You may gently stretch your arm/shoulders and reach overhead.  8. Keep dressing clean and dry. Okay to shower in 2 days. May remove outer large dressing in 2 days prior to shower.  The steristrips will fall off on their own in 10-14 days. Your sutures are dissolvable. Please refrain from submerging in a bathtub or swimming pool.  Please refrain from applying alcohol or peroxide to the incision.  9. Can wear a supportive bra for comfort.                  Your next 10 appointments already scheduled     Jan 05, 2018  4:00 PM CST   MA BREAST SPECIMEN LEFT with [address] [facility] ([facility])    [address] [place] [id number] [phone]           Do not use any powder, lotion or deodorant under your arms or on your breast. If you do, we will ask you to remove it before your exam.  Wear comfortable, two-piece clothing.  If you have any allergies, tell your care team.  Bring any previous mammograms from other facilities or have them mailed to the breast center.            Jan 23, 2018  3:00 PM CST   (Arrive by 2:45 PM)   Post-Op with Andreia Dorantes MD   [facility] ([facility])    [address] [place] [address]  [address]  [facility] [address]   [phone]            Jul 02, 2018  1:15 PM CDT   (Arrive by 1:00 PM)   MA SCREENING BILATERAL W/ TEJAL with [address] [facility] ([facility])    [address] [place] [id number] [phone]           Three-dimensional (3D) mammograms are available at [place] locations in [facility], [place], [place], [facility], [place], " "[place], and [place]. [facility] locations include [place] [facility] in [place]. Benefits of 3D mammograms include: - Improved rate of cancer detection - Decreases your chance of having to go back for more tests, which means fewer: - \"False-positive\" results (This means that there is an abnormal area but it isn't cancer.) - Invasive testing procedures, such as a biopsy or surgery - Can provide clearer images of the breast if you have dense breast tissue. 3D mammography is an optional exam that anyone can have with a 2D mammogram. It doesn't replace or take the place of a 2D mammogram. 2D mammograms remain an effective screening test for all women.  Not all insurance companies cover the cost of a 3D mammogram. Check with your insurance.            Jul 02, 2018  2:15 PM CDT   (Arrive by 2:00 PM)   Return Visit with Valerie Delgado MD   [facility] ([facility])    [address]  [address]  [facility] [address] [phone]              Further instructions from your care team       [facility]  Home Care Following Anesthesia  For 24 hours after surgery:  1. Get plenty of rest.  A responsible adult must stay with you for at least 24 hours after you leave the surgery center.  2. Do not drive or use heavy equipment.  If you have weakness or tingling, don't drive or use heavy equipment until this feeling goes away.   3. Do not drink alcohol.   4. Avoid strenuous or risky activities.  Ask for help when climbing stairs.  5. You may feel lightheaded.  IF so, sit for a few minutes before standing.  Have someone help you get up.   6. If you have nausea (feel sick to your stomach): Drink only clear liquids such as apple juice, ginger ale, broth or 7-Up.  Rest may also help.  Be sure to drink enough fluids.  Move to a regular diet as you feel able.   7. You may have a slight fever.  Call the doctor if " "your fever is over 100 F (37.7 C) (taken under the tongue) or lasts longer than 24 hours.  8. You may have a dry mouth, a sore throat, muscle aches or trouble sleeping. These should go away after 24 hours.  9. Do not make important or legal decisions.        Today you received an Exparel block to numb the nerves near your surgery site.  This is a block using local anesthetic or \"numbing\" medication injected around the nerves to anesthetize or \"numb\" the area supplied by those nerves.  This block is injected into the muscle layer near your surgical site.  This medication may numb the location where you had surgery up to 72 hours.  If your surgical site is an arm or leg you should be careful with your affected limb, since it is possible to injure your limb without being aware of it due to the numbing.  Until full feeling returns, you should guard against bumping or hitting your limb, and avoid extreme hot or cold temperatures on the skin.  As the block wears off, the feeling will return as a tingling or prickly sensation near your surgical site.  You will experince more discomfort from your incision as the feeling returns.  You may want to take a pain pill (a narcotic or Tylenol if this was prescribed by your surgeon) when you start to experience mild pain before the pain beomes more severe.  If your pain medications do not control your pain, you should notify your surgeon.    Tips for taking pain medications  To get the best pain relief possible, remember these points:    Take pain medications as directed, before pain becomes severe.    Pain medication can upset your stomach: taking it with food may help.    Constipation is a common side effect of pain medication. Drink plenty of  fluids.    Eat foods high in fiber. Take a stool softener if recommended by your doctor or pharmacist.    Do not drink alcohol, drive or operate machinery while taking pain medications.    Ask about other ways to control pain, such as with " heat, ice or relaxation.    Tylenol/Acetaminophen Consumption  To help encourage the safe use of acetaminophen, the makers of TYLENOL  have lowered the maximum daily dose for single-ingredient Extra Strength TYLENOL  (acetaminophen) products sold in the [place] from 8 pills per day (4,000 mg) to 6 pills per day (3,000 mg). The dosing interval has also changed from 2 pills every 4-6 hours to 2 pills every 6 hours.    If you feel your pain relief is insufficient, you may take Tylenol/Acetaminophen in addition to your narcotic pain medication.     Be careful not to exceed 3,000 mg of Tylenol/Acetaminophen in a 24 hour period from all sources.    If you are taking extra strength Tylenol/acetaminophen (500 mg), the maximum dose is 6 tablets in 24 hours.    If you are taking regular strength acetaminophen (325 mg), the maximum dose is 9 tablets in 24 hours.    Call a doctor for any of the followin. Signs of infection (fever, growing tenderness at the surgery site, a large amount of drainage or bleeding, severe pain, foul-smelling drainage, redness, swelling).  2. It has been over 8 to 10 hours since surgery and you are still not able to urinate (pass water).  3. Headache for over 24 hours.    Your doctor is:  Dr. Andreia Dorantes, [facility]: [phone]                  Or dial [phone] and ask for the resident on call for:  [facility]  For emergency care, call the:  [facility]:  [phone] (TTY for hearing impaired: [phone])  Post Operative Instructions: Regional Anesthetic for Upper Extremity    General Information:   Regional anesthesia is when local anesthetic or  numbing  medication is injected around the nerves to anesthetize or  numb  the area supplied by that set of nerves.      Types of Regional Blocks:  Interscalene: A block injected into the neck on the operative shoulder/arm of a patient having shoulder surgery  Supraclavicular: A block injected near the clavicle on the  operative shoulder of a patient having elbow, forearm, or hand surgery    Procedure:  The type of anesthesia your doctor used to numb your shoulder or arm will usually not wear off for 6-18 hours, but may last as long as 24 hours. You should be careful during that period, since it is possible to injure your arm without being aware of the injury. While your arm is numb, you should:    Avoid striking or bumping your arm    Avoid extreme hot or cold    Diet:  There are no restrictions on your diet. You should drink plenty of fluids.     Discomfort:  You will have a tingling and prickly sensation in your arm as the feeling begins to return. You can also expect some discomfort. The amount of discomfort is unpredictable, but if you have more pain than can be controlled with pain medication you should notify your physician.     Pain Medicine:   Begin taking your oral pain pills (if you have not already done so) before bedtime and during the night to avoid a sudden onset of pain as the block wears off.  Do not engage in drinking, driving, or hazardous occupations while taking pain medication.     Stitches:   You may have stitches or special skin closures. You doctor will inform you when to return to the office to have them removed.     Activity:  On the day of surgery you should try to stay in bed with your hand elevated on pillows. You may resume your normal activity after that, wearing a sling for comfort. Contact your physician if you have any of the problems:     Continued numbness or tingling in the arm or hand after 24 hours    Swelling of the fingers or fingers that are cold to the touch    Excessive bleeding or drainage    Severe pain                    Pending Results     Date and Time Order Name Status Description    1/5/2018 1515 Surgical pathology exam In process             Admission Information     Date & Time Provider Department Dept. Phone    1/5/2018 Andreia Dorantes MD [facility]  [place] [phone]      Your Vitals Were     Blood Pressure Temperature Respirations Pulse Oximetry          114/68 97.7  F (36.5  C) (Temporal) 16 99%        [email or web address] Information     [employer] gives you secure access to your electronic health record. If you see a primary care provider, you can also send messages to your care team and make appointments. If you have questions, please call your primary care clinic.  If you do not have a primary care provider, please call [phone] and they will assist you.      [employer] is an electronic gateway that provides easy, online access to your medical records. With [employer], you can request a clinic appointment, read your test results, renew a prescription or communicate with your care team.     To access your existing account, please contact your [facility] or call [phone] for assistance.        Care EveryWhere ID     This is your Care EveryWhere ID. This could be used by other organizations to access your [facility] medical records  [id number]        Equal Access to Services     FELECIA AGUILAR : Hadii ofelia Aaron, waaxda lucarolyn, qaybta kaalmaadonis adesergey, karlee white . So [facility] [phone].    ATENCIÓN: Si habla español, tiene a alfred disposición servicios gratuitos de asistencia lingüística. Llame al [phone].    We comply with applicable federal civil rights laws and [place] laws. We do not discriminate on the basis of race, color, national origin, age, disability, sex, sexual orientation, or gender identity.               Review of your medicines      START taking        Dose / Directions    HYDROcodone-acetaminophen 5-325 MG per tablet   Commonly known as:  NORCO   Used for:  S/P lumpectomy of breast        Dose:  1-2 tablet   Take 1-2 tablets by mouth every 6 hours as needed for other (Moderate to Severe Pain)   Quantity:  15 tablet   Refills:  0       senna 8.6 MG tablet   Commonly known  as:  SENOKOT   Used for:  S/P lumpectomy of breast        Dose:  1 tablet   Take 1 tablet by mouth daily   Quantity:  50 tablet   Refills:  0         CONTINUE these medicines which have NOT CHANGED        Dose / Directions    buPROPion 300 MG 24 hr tablet   Commonly known as:  WELLBUTRIN XL   Used for:  Adjustment disorder with depressed mood        Dose:  300 mg   Take 1 tablet (300 mg) by mouth every morning See primary care doctor for additional refills.   Quantity:  60 tablet   Refills:  3       cabergoline 0.5 MG tablet   Commonly known as:  DOSTINEX   Used for:  Pituitary tumor        Dose:  0.25 mg   Take 0.5 tablets (0.25 mg) by mouth twice a week   Quantity:  12 tablet   Refills:  2       LORazepam 1 MG tablet   Commonly known as:  ATIVAN   Used for:  Fear of flying        Dose:  1 mg   Take 1 tablet (1 mg) by mouth every 8 hours as needed for anxiety (for flying)   Quantity:  5 tablet   Refills:  0            Where to get your medicines      These medications were sent to [address] [address]    Hours:  TRANSPLANT PHONE NUMBER [phone] Phone:  [phone]     senna 8.6 MG tablet         Some of these will need a paper prescription and others can be bought over the counter. Ask your nurse if you have questions.     Bring a paper prescription for each of these medications     HYDROcodone-acetaminophen 5-325 MG per tablet                Protect others around you: Learn how to safely use, store and throw away your medicines at www.disposemymeds.org.             Medication List: This is a list of all your medications and when to take them. Check marks below indicate your daily home schedule. Keep this list as a reference.      Medications           Morning Afternoon Evening Bedtime As Needed    buPROPion 300 MG 24 hr tablet   Commonly known as:  WELLBUTRIN XL   Take 1 tablet (300 mg) by mouth  every morning See primary care doctor for additional refills.                                cabergoline 0.5 MG tablet   Commonly known as:  DOSTINEX   Take 0.5 tablets (0.25 mg) by mouth twice a week                                HYDROcodone-acetaminophen 5-325 MG per tablet   Commonly known as:  NORCO   Take 1-2 tablets by mouth every 6 hours as needed for other (Moderate to Severe Pain)                                LORazepam 1 MG tablet   Commonly known as:  ATIVAN   Take 1 tablet (1 mg) by mouth every 8 hours as needed for anxiety (for flying)                                senna 8.6 MG tablet   Commonly known as:  SENOKOT   Take 1 tablet by mouth daily

## 2018-01-05 NOTE — PROGRESS NOTES
SBAR Wire Localization     SITUATION:  Patient to breast imaging center for imaging guided wire localizations before breast lumpectomy or excision biopsy without sentinel node injection.    BACKGROUND:  Breast imaging cancer, breast abnormality  Ordered procedure completed: Yes  Special needs identified: Yes     ASSESSMENT:  SBAR report called to patient care unit because of unexpected event in radiology: No  Allergies and medication list reviewed prior to procedure. Yes  Skin cleansed with ChloraPrep One-Step.  Anesthesia: approximately 8ml of 1% Lidocaine injection subcutaneous before wire insertion administered by the radiologist.   Gauze dressing over insertion site(s).  Post procedure mammogram completed: Yes    Patient tolerance: Patient was emotional during procedure.  Was completed without issue    RECOMMENDATIONS:  Patient transferred to Same Day Surgery in stable condition via wheelchair with Breast Imaging Staff.  Copy of note given to patient and instructions to hand this note to surgery staff.    Please call [facility] at [facility] [phone] if there are any questions.

## 2018-01-08 NOTE — PROVIDER NOTIFICATION
Slightly reddened, hard and bruised incision.  Very painful.  Norco almost done.  Discharge instructions state not to start ibuprofen for 5-7 days.  Acetaminophen doesn't work per patient.  Gave patient clinic phone number.  Asked for her to call clinic to notify them of the incision and to ask if she can start transitioning to ibuprofen.  Patient satisfied with the care and said everyone was very respectful, profession and responsive to her needs.  Had some anxiety because she was in the same situation 1.5 years ago when she was diagnosed with DCIS.  She felt she had some added anxiety and would've appreciated more recognition of that but also understood how difficult it is for us to anticipate that.  Told her it was a very good idea to bring a friend that could empathize and she should feel free to share her anxieties with staff so that we can properly address it.

## 2018-01-09 DIAGNOSIS — G89.18 POST-OP PAIN: Primary | ICD-10-CM

## 2018-01-09 RX ORDER — HYDROCODONE BITARTRATE AND ACETAMINOPHEN 5; 325 MG/1; MG/1
1-2 TABLET ORAL EVERY 4 HOURS PRN
Qty: 20 TABLET | Refills: 0 | Status: SHIPPED | OUTPATIENT
Start: 2018-01-09 | End: 2021-06-20

## 2018-01-11 LAB — COPATH REPORT: NORMAL

## 2018-01-15 PROBLEM — D24.2 INTRADUCTAL PAPILLOMA OF BREAST, LEFT: Status: RESOLVED | Noted: 2018-01-02 | Resolved: 2018-01-15

## 2018-01-15 NOTE — PROGRESS NOTES
"FOLLOW-UP  Jan 16, 2018    Soraida Shen is a 37 year old female who returns for her 1st post-operative follow-up visit.    Treatment to date:  1. Right wire-localized lumpectomy for intraductal papilloma (9/28/2016) - final path showed 3 mm of DCIS within the papilloma  2. Adjuvant radiation to right breast (12/13/2016 to 1/4/2017)  3. Declined adjuvant endocrine therapy  4. Left wire-localized lumpectomy for intraductal papilloma (1/5/2018)    HPI:    She underwent a left wire-localized lumpectomy on 1/5/2018.  She is currently 1 week(s) post-op.  Final surgical pathology showed two small papillomas without atypia or malignancy.    Since the procedure, she has been doing well. She had pain and bruising initially after surgery, however, this has been improving.  She denies any redness or swelling, or drainage from the incision, or fever/chills.  She has good range of motion and denies any upper extremity swelling.     /87 (BP Location: Right arm, Patient Position: Chair, Cuff Size: Adult Regular)  Pulse 78  Temp 97  F (36.1  C) (Oral)  Resp 16  Ht 1.645 m (5' 4.75\")  Wt 78.5 kg (173 lb)  SpO2 98%  BMI 29.01 kg/m2   Physical Exam   Constitutional: She is well-developed, well-nourished, and in no distress.   Pulmonary/Chest: No respiratory distress.       Skin: Skin is warm and dry.       INVESTIGATIONS:    Surgical Pathology (1/5/2018):  FINAL DIAGNOSIS:   LEFT BREAST, MASS, LUMPECTOMY:   - Two small intraductal papillomas (1 mm in greatest dimension) and a detached intraductal fragment of epithelium showing usual ductal hyperplasia.   - Fibrocystic changes including microcysts, apocrine metaplasia, sclerosing adenosis.   - Negative for atypia or malignancy.     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    She is doing well after surgery.  We reviewed the pathology today. No further surgery is indicated.  She " will continue high risk screening with alternating mammogram and MRI.  Her next mammogram will be due July 2018 and she will be following up with Dr Delgado at that time, as well.      I have not made specific follow-up appointments with me at this time and will defer ongoing care to Dr Delgado and to her PCP.  She knows to call if she has concerns.     All of the above was discussed with the patient and all questions were answered.     PLAN:  1. Next mammogram in July 2018  2. Follow up with Dr Delgado in July 2018    Andreia Dorantes MD MSc [facility] FACS    Division of Surgical Oncology  [facility]

## 2018-01-16 ENCOUNTER — OFFICE VISIT (OUTPATIENT)
Dept: ONCOLOGY | Facility: CLINIC | Age: 38
End: 2018-01-16
Attending: SURGERY
Payer: COMMERCIAL

## 2018-01-16 VITALS
TEMPERATURE: 97 F | OXYGEN SATURATION: 98 % | DIASTOLIC BLOOD PRESSURE: 87 MMHG | SYSTOLIC BLOOD PRESSURE: 127 MMHG | WEIGHT: 173 LBS | RESPIRATION RATE: 16 BRPM | BODY MASS INDEX: 28.82 KG/M2 | HEIGHT: 65 IN | HEART RATE: 78 BPM

## 2018-01-16 DIAGNOSIS — D05.11 DUCTAL CARCINOMA IN SITU (DCIS) OF RIGHT BREAST: Primary | ICD-10-CM

## 2018-01-16 DIAGNOSIS — D24.2 INTRADUCTAL PAPILLOMA OF BREAST, LEFT: ICD-10-CM

## 2018-01-16 PROCEDURE — G0463 HOSPITAL OUTPT CLINIC VISIT: HCPCS | Mod: ZF

## 2018-01-16 NOTE — MR AVS SNAPSHOT
"              After Visit Summary   1/16/2018 April Cindy Shen    MRN: [id number]           Patient Information     Date Of Birth          1980        Visit Information        Provider Department      1/16/2018 2:15 PM Andreia Dorantes MD [facility]        Today's Diagnoses     Ductal carcinoma in situ (DCIS) of right breast    -  1    Intraductal papilloma of breast, left           Follow-ups after your visit        Your next 10 appointments already scheduled     Jul 02, 2018  1:15 PM CDT   (Arrive by 1:00 PM)   MA SCREENING BILATERAL W/ TEJAL with [id number]   [facility] ([facility])    [address]  2nd Floor  [place] [facility] [place] [address] [phone]           Three-dimensional (3D) mammograms are available at [place] locations in [facility], [place], [facility], [facility], and [place]. [facility] locations include [place] and [facility] in [place]. Benefits of 3D mammograms include: - Improved rate of cancer detection - Decreases your chance of having to go back for more tests, which means fewer: - \"False-positive\" results (This means that there is an abnormal area but it isn't cancer.) - Invasive testing procedures, such as a biopsy or surgery - Can provide clearer images of the breast if you have dense breast tissue. 3D mammography is an optional exam that anyone can have with a 2D mammogram. It doesn't replace or take the place of a 2D mammogram. 2D mammograms remain an effective screening test for all women.  Not all insurance companies cover the cost of a 3D mammogram. Check with your insurance.            Jul 02, 2018  2:15 PM CDT   (Arrive by 2:00 PM)   Return Visit with Valerie Delgado MD   [facility] ([facility])    [address]  [address]  [place] [address] [phone]              Who to contact     If " "you have questions or need follow up information about today's clinic visit or your schedule please contact [facility] directly at [phone].  Normal or non-critical lab and imaging results will be communicated to you by MyChart, letter or phone within 4 business days after the clinic has received the results. If you do not hear from us within 7 days, please contact the clinic through ????hart or phone. If you have a critical or abnormal lab result, we will notify you by phone as soon as possible.  Submit refill requests through [employer] or call your pharmacy and they will forward the refill request to us. Please allow 3 business days for your refill to be completed.          Additional Information About Your Visit        ????harTweetUp Information     [employer] gives you secure access to your electronic health record. If you see a primary care provider, you can also send messages to your care team and make appointments. If you have questions, please call your primary care clinic.  If you do not have a primary care provider, please call [phone] and they will assist you.        Care EveryWhere ID     This is your Care EveryWhere ID. This could be used by other organizations to access your [facility] medical records  [id number]        Your Vitals Were     Pulse Temperature Respirations Height Pulse Oximetry BMI (Body Mass Index)    78 97  F (36.1  C) (Oral) 16 1.645 m (5' 4.75\") 98% 29.01 kg/m2       Blood Pressure from Last 3 Encounters:   01/16/18 127/87   01/05/18 96/59   01/02/18 121/81    Weight from Last 3 Encounters:   01/16/18 78.5 kg (173 lb)   12/19/17 80.3 kg (177 lb)   12/11/17 80.9 kg (178 lb 4.8 oz)              Today, you had the following     No orders found for display       Primary Care Provider Office Phone # Fax #    Bita Perez [phone]       [address] [address]        Equal Access to Services     FELECIA AGUILAR AH: Hadii aad " curry Aaron, shane colon, qajames kashanna antonsergey, waxcara idiin haymelagovind waltonjavier nicoleladanjareth white anel. So [facility] [phone].    ATENCIÓN: Si grahamla tyler, tiene a alfred disposición servicios gratuitos de asistencia lingüística. Angel al [phone].    We comply with applicable federal civil rights laws and [place] laws. We do not discriminate on the basis of race, color, national origin, age, disability, sex, sexual orientation, or gender identity.            Thank you!     Thank you for choosing [facility]  for your care. Our goal is always to provide you with excellent care. Hearing back from our patients is one way we can continue to improve our services. Please take a few minutes to complete the written survey that you may receive in the mail after your visit with us. Thank you!             Your Updated Medication List - Protect others around you: Learn how to safely use, store and throw away your medicines at www.disposemymeds.org.          This list is accurate as of: 1/16/18  3:52 PM.  Always use your most recent med list.                   Brand Name Dispense Instructions for use Diagnosis    buPROPion 300 MG 24 hr tablet    WELLBUTRIN XL    60 tablet    Take 1 tablet (300 mg) by mouth every morning See primary care doctor for additional refills.    Adjustment disorder with depressed mood       cabergoline 0.5 MG tablet    DOSTINEX    12 tablet    Take 0.5 tablets (0.25 mg) by mouth twice a week    Pituitary tumor       * HYDROcodone-acetaminophen 5-325 MG per tablet    NORCO    15 tablet    Take 1-2 tablets by mouth every 6 hours as needed for other (Moderate to Severe Pain)    S/P lumpectomy of breast       * HYDROcodone-acetaminophen 5-325 MG per tablet    NORCO    20 tablet    Take 1-2 tablets by mouth every 4 hours as needed for moderate to severe pain    Post-op pain       LORazepam 1 MG tablet    ATIVAN    5 tablet    Take 1 tablet (1 mg) by mouth every 8 hours as needed for anxiety  (for flying)    Fear of flying       senna 8.6 MG tablet    SENOKOT    50 tablet    Take 1 tablet by mouth daily    S/P lumpectomy of breast       * Notice:  This list has 2 medication(s) that are the same as other medications prescribed for you. Read the directions carefully, and ask your doctor or other care provider to review them with you.

## 2018-01-16 NOTE — Clinical Note
"1/16/2018       RE: Soraida Shen  [address]  [place] [facility] [address]     Dear Colleague,    Thank you for referring your patient, Soraida Shen, to the [facility] at [facility] Please see a copy of my visit note below.    FOLLOW-UP  Jan 16, 2018    Soraida Shen is a 37 year old female who returns for her 1st post-operative follow-up visit.    Treatment to date:  1. Right wire-localized lumpectomy for intraductal papilloma (9/28/2016) - final path showed 3 mm of DCIS within the papilloma  2. Adjuvant radiation to right breast (12/13/2016 to 1/4/2017)  3. Declined adjuvant endocrine therapy  4. Left wire-localized lumpectomy for intraductal papilloma (1/5/2018)    HPI:    She underwent a left wire-localized lumpectomy on 1/5/2018.  She is currently 1 week(s) post-op.  Final surgical pathology showed two small papillomas without atypia or malignancy.    Since the procedure, she has been doing well. She had pain and bruising initially after surgery, however, this has been improving.  She denies any redness or swelling, or drainage from the incision, or fever/chills.  She has good range of motion and denies any upper extremity swelling.     /87 (BP Location: Right arm, Patient Position: Chair, Cuff Size: Adult Regular)  Pulse 78  Temp 97  F (36.1  C) (Oral)  Resp 16  Ht 1.645 m (5' 4.75\")  Wt 78.5 kg (173 lb)  SpO2 98%  BMI 29.01 kg/m2   Physical Exam   Constitutional: She is well-developed, well-nourished, and in no distress.   Pulmonary/Chest: No respiratory distress.       Skin: Skin is warm and dry.       INVESTIGATIONS:    Surgical Pathology (1/5/2018):  FINAL DIAGNOSIS:   LEFT BREAST, MASS, LUMPECTOMY:   - Two small intraductal papillomas (1 mm in greatest dimension) and a detached intraductal fragment of epithelium showing usual ductal hyperplasia.   - Fibrocystic changes including microcysts, apocrine metaplasia, sclerosing adenosis. "   - Negative for atypia or malignancy.     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    She is doing well after surgery.  We reviewed the pathology today. No further surgery is indicated.  She will continue high risk screening with alternating mammogram and MRI.  Her next mammogram will be due July 2018 and she will be following up with Dr Delgado at that time, as well.      I have not made specific follow-up appointments with me at this time and will defer ongoing care to Dr Delgado and to her PCP.  She knows to call if she has concerns.     All of the above was discussed with the patient and all questions were answered.     PLAN:  1. Next mammogram in July 2018  2. Follow up with Dr Delgado in July 2018    Andreia Dorantes MD MSc [facility] FACS    Division of Surgical Oncology  [facility]       Again, thank you for allowing me to participate in the care of your patient.      Sincerely,    Andreia Dorantes MD

## 2018-01-16 NOTE — Clinical Note
"1/16/2018      RE: Soraida Shen  [address]  [facility] [address]       FOLLOW-UP  Jan 16, 2018    oSraida Shen is a 37 year old female who returns for her 1st post-operative follow-up visit.    Treatment to date:  1. Right wire-localized lumpectomy for intraductal papilloma (9/28/2016) - final path showed 3 mm of DCIS within the papilloma  2. Adjuvant radiation to right breast (12/13/2016 to 1/4/2017)  3. Declined adjuvant endocrine therapy  4. Left wire-localized lumpectomy for intraductal papilloma (1/5/2018)    HPI:    She underwent a left wire-localized lumpectomy on 1/5/2018.  She is currently 1 week(s) post-op.  Final surgical pathology showed two small papillomas without atypia or malignancy.    Since the procedure, she has been doing well. She had pain and bruising initially after surgery, however, this has been improving.  She denies any redness or swelling, or drainage from the incision, or fever/chills.  She has good range of motion and denies any upper extremity swelling.     /87 (BP Location: Right arm, Patient Position: Chair, Cuff Size: Adult Regular)  Pulse 78  Temp 97  F (36.1  C) (Oral)  Resp 16  Ht 1.645 m (5' 4.75\")  Wt 78.5 kg (173 lb)  SpO2 98%  BMI 29.01 kg/m2   Physical Exam   Constitutional: She is well-developed, well-nourished, and in no distress.   Pulmonary/Chest: No respiratory distress.       Skin: Skin is warm and dry.       INVESTIGATIONS:    Surgical Pathology (1/5/2018):  FINAL DIAGNOSIS:   LEFT BREAST, MASS, LUMPECTOMY:   - Two small intraductal papillomas (1 mm in greatest dimension) and a detached intraductal fragment of epithelium showing usual ductal hyperplasia.   - Fibrocystic changes including microcysts, apocrine metaplasia, sclerosing adenosis.   - Negative for atypia or malignancy.     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    She is doing well " after surgery.  We reviewed the pathology today. No further surgery is indicated.  She will continue high risk screening with alternating mammogram and MRI.  Her next mammogram will be due July 2018 and she will be following up with Dr Delgado at that time, as well.      I have not made specific follow-up appointments with me at this time and will defer ongoing care to Dr Delgado and to her PCP.  She knows to call if she has concerns.     All of the above was discussed with the patient and all questions were answered.     PLAN:  1. Next mammogram in July 2018  2. Follow up with Dr Delgado in July 2018    Andreia Dorantes MD MSc [facility]    Division of Surgical Oncology  [facility]       Andreia Dorantes MD

## 2018-01-16 NOTE — NURSING NOTE
"Oncology Rooming Note    January 16, 2018 2:41 PM   April Cindy Shen is a 37 year old female who presents for:    Chief Complaint   Patient presents with     Oncology Clinic Visit     Return: Post op Ductal Carcinoma     Initial Vitals: There were no vitals taken for this visit. Estimated body mass index is 29.67 kg/(m^2) as calculated from the following:    Height as of 12/11/17: 1.645 m (5' 4.76\").    Weight as of 12/19/17: 80.3 kg (177 lb). There is no height or weight on file to calculate BSA.  Data Unavailable Comment: Data Unavailable   No LMP recorded. Patient is not currently having periods (Reason: IUD).  Allergies reviewed: Yes  Medications reviewed: Yes    Medications: Medication refills not needed today.  Pharmacy name entered into [facility]: [facility] [address] [place] [address]    Clinical concerns: no new concerns today Dr. Dorantes was NOT notified.    10 minutes for nursing intake (face to face time)     Xander Chiang CMA              "

## 2018-01-16 NOTE — LETTER
"2018      RE: Soraida Shen  [address]  [place] [facility] [address]     2018    Bita Perez MD   [address] [address]    RE: Soraida Shen  (: 1980)    Dear Dr. Bita Perez:    Your patient was seen for evaluation in my office.  Please find a copy of my notes for your record and review.  If you have any further questions, please feel free to contact my office.   Thank you for your kind referral.    Sincerely,   Andreia Dorantes MD MSc [facility] FACS    ---     FOLLOW-UP  2018    Soraida Shen is a 37 year old female who returns for her 1st post-operative follow-up visit.    Treatment to date:  1. Right wire-localized lumpectomy for intraductal papilloma (2016) - final path showed 3 mm of DCIS within the papilloma  2. Adjuvant radiation to right breast (2016 to 2017)  3. Declined adjuvant endocrine therapy  4. Left wire-localized lumpectomy for intraductal papilloma (2018)    HPI:    She underwent a left wire-localized lumpectomy on 2018.  She is currently 1 week(s) post-op.  Final surgical pathology showed two small papillomas without atypia or malignancy.    Since the procedure, she has been doing well. She had pain and bruising initially after surgery, however, this has been improving.  She denies any redness or swelling, or drainage from the incision, or fever/chills.  She has good range of motion and denies any upper extremity swelling.     /87 (BP Location: Right arm, Patient Position: Chair, Cuff Size: Adult Regular)  Pulse 78  Temp 97  F (36.1  C) (Oral)  Resp 16  Ht 1.645 m (5' 4.75\")  Wt 78.5 kg (173 lb)  SpO2 98%  BMI 29.01 kg/m2   Physical Exam   Constitutional: She is well-developed, well-nourished, and in no distress.   Pulmonary/Chest: No respiratory distress.       Skin: Skin is warm and dry.       INVESTIGATIONS:    Surgical Pathology (2018):  FINAL DIAGNOSIS:   LEFT BREAST, MASS, " LUMPECTOMY:   - Two small intraductal papillomas (1 mm in greatest dimension) and a detached intraductal fragment of epithelium showing usual ductal hyperplasia.   - Fibrocystic changes including microcysts, apocrine metaplasia, sclerosing adenosis.   - Negative for atypia or malignancy.     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    She is doing well after surgery.  We reviewed the pathology today. No further surgery is indicated.  She will continue high risk screening with alternating mammogram and MRI.  Her next mammogram will be due July 2018 and she will be following up with Dr Delgado at that time, as well.      I have not made specific follow-up appointments with me at this time and will defer ongoing care to Dr Delgado and to her PCP.  She knows to call if she has concerns.     All of the above was discussed with the patient and all questions were answered.     PLAN:  1. Next mammogram in July 2018  2. Follow up with Dr Delgado in July 2018    Andreia Dorantes MD MSc [facility] FACS    Division of Surgical Oncology  [facility]

## 2018-01-16 NOTE — LETTER
"2018      RE: Soraida Shen  [address]  [facility] [facility] [address]     2018    Valerie Delgado MD  [address] [place] [address]    RE: Soraida Shen  (: 1980)    Dear Dr. Delgado    Your patient was seen for evaluation in my office.  Please find a copy of my notes for your record and review.  If you have any further questions, please feel free to contact my office.   Thank you for your kind referral.    Sincerely,   Andreia Dorantes MD MSc [facility] FACS    ---       FOLLOW-UP  2018    Soraida Shen is a 37 year old female who returns for her 1st post-operative follow-up visit.    Treatment to date:  1. Right wire-localized lumpectomy for intraductal papilloma (2016) - final path showed 3 mm of DCIS within the papilloma  2. Adjuvant radiation to right breast (2016 to 2017)  3. Declined adjuvant endocrine therapy  4. Left wire-localized lumpectomy for intraductal papilloma (2018)    HPI:    She underwent a left wire-localized lumpectomy on 2018.  She is currently 1 week(s) post-op.  Final surgical pathology showed two small papillomas without atypia or malignancy.    Since the procedure, she has been doing well. She had pain and bruising initially after surgery, however, this has been improving.  She denies any redness or swelling, or drainage from the incision, or fever/chills.  She has good range of motion and denies any upper extremity swelling.     /87 (BP Location: Right arm, Patient Position: Chair, Cuff Size: Adult Regular)  Pulse 78  Temp 97  F (36.1  C) (Oral)  Resp 16  Ht 1.645 m (5' 4.75\")  Wt 78.5 kg (173 lb)  SpO2 98%  BMI 29.01 kg/m2   Physical Exam   Constitutional: She is well-developed, well-nourished, and in no distress.   Pulmonary/Chest: No respiratory distress.       Skin: Skin is warm and dry.       INVESTIGATIONS:    Surgical Pathology (2018):  FINAL DIAGNOSIS:   LEFT BREAST, MASS, LUMPECTOMY:   - " Two small intraductal papillomas (1 mm in greatest dimension) and a detached intraductal fragment of epithelium showing usual ductal hyperplasia.   - Fibrocystic changes including microcysts, apocrine metaplasia, sclerosing adenosis.   - Negative for atypia or malignancy.     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    She is doing well after surgery.  We reviewed the pathology today. No further surgery is indicated.  She will continue high risk screening with alternating mammogram and MRI.  Her next mammogram will be due July 2018 and she will be following up with Dr Delgado at that time, as well.      I have not made specific follow-up appointments with me at this time and will defer ongoing care to Dr Delgado and to her PCP.  She knows to call if she has concerns.     All of the above was discussed with the patient and all questions were answered.     PLAN:  1. Next mammogram in July 2018  2. Follow up with Dr Delgado in July 2018    Andreia Dorantes MD MSc [facility] FACS    Division of Surgical Oncology  [facility]

## 2018-07-20 DIAGNOSIS — D49.7 PITUITARY TUMOR: ICD-10-CM

## 2018-07-22 NOTE — TELEPHONE ENCOUNTER
cabergoline (DOSTINEX) 0.5 MG tablet   Last Written Prescription Date:  9/14/17  Last Fill Quantity: 12,   # refills: 2  Last Office Visit :9/5/17  Future Office visit:  None    Routing  Because:  Provider preference

## 2018-07-22 NOTE — PROGRESS NOTES
Oncology Follow Up/Preoperative physical:  Date on this visit: 7/23/2018    Diagnosis:  [id number], low-grade, ER/GA positive, solid and cribriform type, right breast DCIS.    Primary Physician: Bita Perez     History Of Present Illness:  Ms. Shen is a 38 year old female with a h/o pituitary microadenoma with right breast DCIS.  Ms. Shen first noted a clear/yellowish discharge from the right nipple in the fall of 2015.  Mammogram showed no concerning findings.  Right breast ultrasound showed prominent ducts in the subareolar right breast.  Ductogram was attempted but was unsuccessful.  Contrast enhanced mammogram showed a 9 mm mass in the lateral right breast at the 7 o'clock position, 8-9 cm from the nipple.  Ultrasound showed dilated ducts with filling defects at the 8:30 position, 5 cm from the nipple.  The most prominent filling defect measured 1.2 cm.  Right breast biopsy showed intraductal papilloma without atypia.  She was referred to see a surgeon.  She was told that one of our surgeons removes these and the other does not.  Because she was told that one of our surgeons does not remove them, she did not feel it was important to see a surgeon.      In the summer of 2016, she saw her PCP, who recommended she follow through with seeing a surgeon.  Repeat right breast ultrasound showed mass at the area of previous biopsy to measure 6 mm and was felt to be unchanged from prior.  On 9/28/16, right breast excisional biopsy was performed by Dr. Dorantes.  Pathology showed low grade, papillary, solid, and cribriform type DCIS measuring 3 mm.  No comedonecrosis.  Surgical margin was negative, but <1 mm.  Estrogen and progesterone receptor staining were positive in >95% of tumor cells.  The focus of DCIS was within intraductal papilloma.  She received 4240 cGy in 16 fractions (12/13/16-1/4/17) to the right breast and boost of 1000 cGy in 4 fractions (1/5/17-1/12/17) to the tumor site.  She declined adjuvant endocrine  therapy due to h/o mood disorder.    Breast MRI in 01/2018 showed a small area of enhancement in the posterior left breast.  Biopsy was c/w PASH and <1 mm papilloma without atypia.  Surgical excision on 1/5/18 showed two small intraductal papillomas, 1 mm in greatest dimension, and a detached intraductal fragment of epithelium showing usual ductal hyperplasia.    Ms. Shen had genetic counseling and BreastNext testing returned negative.     Interval History:  Ms. Shen comes into clinic today for routine breast cancer followup.  She is currently without concerns or complaints.  She specifically denies breast lumps or masses.  She has intermittent pruritus of her left breast lumpectomy excision.  She had annual mammogram today during which a left breast ultrasound was recommended. Fortunately, the ultrasound was suggestive of benign cyst.  She has 2 symptoms which are concerning to her.  One, is intermittent groin pain; this tends to come and go.  It is not bothersome enough for her to take pain medicine and does not keep her up from sleep at night.  She states that she has a family history of cardiac disease and is worried that this might be a sign or symptom of cardiovascular disease.  The other symptom is an intermittent buzzing feeling in her body.  She states that when she was a child she used to lie down at night and have a sensation of whole body vibration/buzzing and that her body was being pulled down into the bed.  This resolved, but then a few weeks ago on a trip to [place] after she had been walking quite a bit, she developed the same sensation in her right calf.  The sensation has resolved and has not returned.  She has no cough, shortness of breath or chest pain.  She has no swelling of her lower extremities.  She denies new bone or joint aches or pains.  She has no abdominal complaints.  She has no headache, visual changes or focal neurologic complaints.  The remainder of a complete 12-point review  of systems was completed and was negative with the exception of that mentioned above.       Past Medical/Surgical History:  Past Medical History:   Diagnosis Date     Anxiety      Breast CA (H)      Depression      Intraductal papilloma of breast 2015    right; biopsy proven 11/25/15     Peptic ulcer disease      Pituitary microadenoma (H) 2010    6 mm      Prolactinoma (H)     6 mm pituitary mass     Past Surgical History:   Procedure Laterality Date     LUMPECTOMY BREAST Right 9/28/2016    Procedure: LUMPECTOMY BREAST;  Surgeon: Andreia Dorantes MD;  Location: [facility] OR     LUMPECTOMY BREAST Left 1/5/2018    Procedure: LUMPECTOMY BREAST;  Left Wire Localized Lumpectomy;  Surgeon: Andreia Dorantes MD;  Location: [facility] OR     wisdom teeth extraction       Allergies:  Allergies as of 07/23/2018 - [place] as Reviewed 01/16/2018   Allergen Reaction Noted     Fruit & vegetable Hives 09/06/2016     Current Medications:  Current Outpatient Prescriptions   Medication Sig Dispense Refill     buPROPion (WELLBUTRIN XL) 300 MG 24 hr tablet Take 1 tablet (300 mg) by mouth every morning See primary care doctor for additional refills. 60 tablet 3     cabergoline (DOSTINEX) 0.5 MG tablet Take 0.5 tablets (0.25 mg) by mouth twice a week 12 tablet 2     HYDROcodone-acetaminophen (NORCO) 5-325 MG per tablet Take 1-2 tablets by mouth every 4 hours as needed for moderate to severe pain (Patient not taking: Reported on 1/16/2018) 20 tablet 0     HYDROcodone-acetaminophen (NORCO) 5-325 MG per tablet Take 1-2 tablets by mouth every 6 hours as needed for other (Moderate to Severe Pain) (Patient not taking: Reported on 1/16/2018) 15 tablet 0     LORazepam (ATIVAN) 1 MG tablet Take 1 tablet (1 mg) by mouth every 8 hours as needed for anxiety (for flying) (Patient not taking: Reported on 1/16/2018) 5 tablet 0     senna (SENOKOT) 8.6 MG tablet Take 1 tablet by mouth daily (Patient not taking: Reported on 1/16/2018) 50 tablet 0      Family  "History and Social History:  Reviewed and unchanged from prior.  Please see initial consultation dated 2/13/17 for further details.    Physical Exam:  /62  Pulse 71  Temp 98.2  F (36.8  C) (Oral)  Resp 16  Ht 1.645 m (5' 4.75\")  Wt 74.4 kg (164 lb)  SpO2 96%  Breastfeeding? No  BMI 27.5 kg/m2  General:  Well appearing, well-nourished adult female in NAD.  HEENT:  Normocephalic.  Sclera anicteric.  MMM.  No lesions of the oropharynx.  Lymph:  No palpable cervical, supraclavicular, or axillary LAD.  Chest:  CTA bilaterally.  No wheezes or crackles.  CV:  RRR.  Nl S1 and S2.  No m/r/g.  Breast:  Bilateral breasts are of increased fibroglandular density.  There is hyperpigmentation and increased overall density of the right breast in the distribution of the radiation field.  There are no discretely palpable masses in either breast.  Bilateral nipples are everted.  No nipple discharge.  Abd:  Soft/NT/ND.  BSs normoactive.  No hepatosplenomegaly.  Ext:  No pitting edema of the bilateral lower extremities.  Pulses 2+ and symmetric.  Musculo:  Strength 5/5 throughout.  Neuro:  Cranial nerves grossly intact.  Gait stable.  Psych:  Mood and affect appear normal.    Laboratory/Imaging Studies:  7/23/18 Bilateral screening mammogram:  Possible asymmetry in the left breast at approximately 3:00 posterior on the left.    7/23/18 Left breast ultrasound:  1 cm benign appearing cyst in area of asymmetry seen on mammogram.  Other small cysts are seen as well.    ASSESSMENT/PLAN:  April is a 39 yo premenopausal female with a stage 0, [id number], low-grade, 3 mm, cribriform, papillary, and solid type, ER positive, DE positive, DCIS of the right breast.  She completed right breast radiation in 01/2017.     1.  Right breast DCIS/Left breast PASH:  April is 1 year 10 months out from excision of right breast DCIS.  There is no evidence of disease recurrence on clinical exam performed today.  Bilateral mammograms showed an " asymmetry in the left breast, however, ultrasound confirmed this was a cyst.  We discussed that periareolar pruritis is likely due to evolving surgical change/nerve regeneration.  I will see her back in 6 months for her next visit.  Given increased breast density and young age at diagnosis, we are screening with both annual mammogram with tomosynthesis and breast MRI spaced so that she is having some form of imaging every 6 months.  She will be due for breast MRI at the time of her return visit in 6 months.    2.  Depression/Anxiety:  Improved since last visit.  Continue wellbutrin 300 mg daily.      3.  Right groin pain:  I suspect this is secondary to right hip arthritis.  I recommended she discuss with her PCP as further evaluation with an X-ray or perhaps a physical therapy referral may be helpful.  Likewise I recommended she discuss her family history and concerns for cardiovascular disease with her PCP.    4.  Vibratory sensation:  Unclear etiology.  Symptom is infrequent and currently not present.  Recommended observation for now.  Suggested she keep a diary to determine if linked to specific activity.    5.  Follow Up:  Return in approximately 6 months for visit with me.  Breast MRI 1-2 business days prior to return visit.    It was a pleasure to see April in clinic today.  A total of 20 minutes of our 25 minute face to face visit was spent in counseling.

## 2018-07-23 ENCOUNTER — ONCOLOGY VISIT (OUTPATIENT)
Dept: ONCOLOGY | Facility: CLINIC | Age: 38
End: 2018-07-23
Attending: INTERNAL MEDICINE
Payer: COMMERCIAL

## 2018-07-23 ENCOUNTER — RADIANT APPOINTMENT (OUTPATIENT)
Dept: MAMMOGRAPHY | Facility: CLINIC | Age: 38
End: 2018-07-23
Payer: COMMERCIAL

## 2018-07-23 ENCOUNTER — RADIANT APPOINTMENT (OUTPATIENT)
Dept: MAMMOGRAPHY | Facility: CLINIC | Age: 38
End: 2018-07-23
Attending: INTERNAL MEDICINE
Payer: COMMERCIAL

## 2018-07-23 VITALS
TEMPERATURE: 98.2 F | WEIGHT: 164 LBS | SYSTOLIC BLOOD PRESSURE: 100 MMHG | BODY MASS INDEX: 27.32 KG/M2 | DIASTOLIC BLOOD PRESSURE: 62 MMHG | HEIGHT: 65 IN | RESPIRATION RATE: 16 BRPM | HEART RATE: 71 BPM | OXYGEN SATURATION: 96 %

## 2018-07-23 DIAGNOSIS — D05.11 DUCTAL CARCINOMA IN SITU (DCIS) OF RIGHT BREAST: Primary | ICD-10-CM

## 2018-07-23 DIAGNOSIS — Z12.39 BREAST CANCER SCREENING, HIGH RISK PATIENT: ICD-10-CM

## 2018-07-23 DIAGNOSIS — D05.11 DUCTAL CARCINOMA IN SITU (DCIS) OF RIGHT BREAST: ICD-10-CM

## 2018-07-23 DIAGNOSIS — R92.30 DENSE BREAST TISSUE ON MAMMOGRAM: ICD-10-CM

## 2018-07-23 DIAGNOSIS — R92.8 ABNORMAL MAMMOGRAM OF LEFT BREAST: ICD-10-CM

## 2018-07-23 PROCEDURE — 99214 OFFICE O/P EST MOD 30 MIN: CPT | Mod: ZP | Performed by: INTERNAL MEDICINE

## 2018-07-23 PROCEDURE — G0463 HOSPITAL OUTPT CLINIC VISIT: HCPCS | Mod: ZF

## 2018-07-23 RX ORDER — CABERGOLINE 0.5 MG/1
0.25 TABLET ORAL
Qty: 12 TABLET | Refills: 0 | Status: SHIPPED | OUTPATIENT
Start: 2018-07-23 | End: 2018-10-31

## 2018-07-23 ASSESSMENT — PAIN SCALES - GENERAL: PAINLEVEL: NO PAIN (0)

## 2018-07-23 NOTE — LETTER
7/23/2018       RE: Soraida Shen  [address]  [facility] [id number]     Dear Colleague,    Thank you for referring your patient, Soraida Shen, to the [facility]. Please see a copy of my visit note below.    Oncology Follow Up/Preoperative physical:  Date on this visit: 7/23/2018    Diagnosis:  [id number], low-grade, ER/GA positive, solid and cribriform type, right breast DCIS.    Primary Physician: Bita Perez     History Of Present Illness:  Ms. Shen is a 38 year old female with a h/o pituitary microadenoma with right breast DCIS.  Ms. Shen first noted a clear/yellowish discharge from the right nipple in the fall of 2015.  Mammogram showed no concerning findings.  Right breast ultrasound showed prominent ducts in the subareolar right breast.  Ductogram was attempted but was unsuccessful.  Contrast enhanced mammogram showed a 9 mm mass in the lateral right breast at the 7 o'clock position, 8-9 cm from the nipple.  Ultrasound showed dilated ducts with filling defects at the 8:30 position, 5 cm from the nipple.  The most prominent filling defect measured 1.2 cm.  Right breast biopsy showed intraductal papilloma without atypia.  She was referred to see a surgeon.  She was told that one of our surgeons removes these and the other does not.  Because she was told that one of our surgeons does not remove them, she did not feel it was important to see a surgeon.      In the summer of 2016, she saw her PCP, who recommended she follow through with seeing a surgeon.  Repeat right breast ultrasound showed mass at the area of previous biopsy to measure 6 mm and was felt to be unchanged from prior.  On 9/28/16, right breast excisional biopsy was performed by Dr. Dorantes.  Pathology showed low grade, papillary, solid, and cribriform type DCIS measuring 3 mm.  No comedonecrosis.  Surgical margin was negative, but <1 mm.  Estrogen and progesterone receptor staining were positive in >95% of tumor cells.   The focus of DCIS was within intraductal papilloma.  She received 4240 cGy in 16 fractions (12/13/16-1/4/17) to the right breast and boost of 1000 cGy in 4 fractions (1/5/17-1/12/17) to the tumor site.  She declined adjuvant endocrine therapy due to h/o mood disorder.    Breast MRI in 01/2018 showed a small area of enhancement in the posterior left breast.  Biopsy was c/w PASH and <1 mm papilloma without atypia.  Surgical excision on 1/5/18 showed two small intraductal papillomas, 1 mm in greatest dimension, and a detached intraductal fragment of epithelium showing usual ductal hyperplasia.    Ms. Shen had genetic counseling and BreastNext testing returned negative.     Interval History:  Ms. Shen comes into clinic today for routine breast cancer followup.  She is currently without concerns or complaints.  She specifically denies breast lumps or masses.  She has intermittent pruritus of her left breast lumpectomy excision.  She had annual mammogram today during which a left breast ultrasound was recommended. Fortunately, the ultrasound was suggestive of benign cyst.  She has 2 symptoms which are concerning to her.  One, is intermittent groin pain; this tends to come and go.  It is not bothersome enough for her to take pain medicine and does not keep her up from sleep at night.  She states that she has a family history of cardiac disease and is worried that this might be a sign or symptom of cardiovascular disease.  The other symptom is an intermittent buzzing feeling in her body.  She states that when she was a child she used to lie down at night and have a sensation of whole body vibration/buzzing and that her body was being pulled down into the bed.  This resolved, but then a few weeks ago on a trip to [place] after she had been walking quite a bit, she developed the same sensation in her right calf.  The sensation has resolved and has not returned.  She has no cough, shortness of breath or chest pain.  She  has no swelling of her lower extremities.  She denies new bone or joint aches or pains.  She has no abdominal complaints.  She has no headache, visual changes or focal neurologic complaints.  The remainder of a complete 12-point review of systems was completed and was negative with the exception of that mentioned above.       Past Medical/Surgical History:  Past Medical History:   Diagnosis Date     Anxiety      Breast CA (H)      Depression      Intraductal papilloma of breast 2015    right; biopsy proven 11/25/15     Peptic ulcer disease      Pituitary microadenoma (H) 2010    6 mm      Prolactinoma (H)     6 mm pituitary mass     Past Surgical History:   Procedure Laterality Date     LUMPECTOMY BREAST Right 9/28/2016    Procedure: LUMPECTOMY BREAST;  Surgeon: Andreia Dorantes MD;  Location: [facility]     LUMPECTOMY BREAST Left 1/5/2018    Procedure: LUMPECTOMY BREAST;  Left Wire Localized Lumpectomy;  Surgeon: Andreia Dorantes MD;  Location: [facility]     wisdom teeth extraction       Allergies:  Allergies as of 07/23/2018 - [place] as Reviewed 01/16/2018   Allergen Reaction Noted     Fruit & vegetable Hives 09/06/2016     Current Medications:  Current Outpatient Prescriptions   Medication Sig Dispense Refill     buPROPion (WELLBUTRIN XL) 300 MG 24 hr tablet Take 1 tablet (300 mg) by mouth every morning See primary care doctor for additional refills. 60 tablet 3     cabergoline (DOSTINEX) 0.5 MG tablet Take 0.5 tablets (0.25 mg) by mouth twice a week 12 tablet 2     HYDROcodone-acetaminophen (NORCO) 5-325 MG per tablet Take 1-2 tablets by mouth every 4 hours as needed for moderate to severe pain (Patient not taking: Reported on 1/16/2018) 20 tablet 0     HYDROcodone-acetaminophen (NORCO) 5-325 MG per tablet Take 1-2 tablets by mouth every 6 hours as needed for other (Moderate to Severe Pain) (Patient not taking: Reported on 1/16/2018) 15 tablet 0     LORazepam (ATIVAN) 1 MG tablet Take 1 tablet (1 mg) by mouth  "every 8 hours as needed for anxiety (for flying) (Patient not taking: Reported on 1/16/2018) 5 tablet 0     senna (SENOKOT) 8.6 MG tablet Take 1 tablet by mouth daily (Patient not taking: Reported on 1/16/2018) 50 tablet 0      Family History and Social History:  Reviewed and unchanged from prior.  Please see initial consultation dated 2/13/17 for further details.    Physical Exam:  /62  Pulse 71  Temp 98.2  F (36.8  C) (Oral)  Resp 16  Ht 1.645 m (5' 4.75\")  Wt 74.4 kg (164 lb)  SpO2 96%  Breastfeeding? No  BMI 27.5 kg/m2  General:  Well appearing, well-nourished adult female in NAD.  HEENT:  Normocephalic.  Sclera anicteric.  MMM.  No lesions of the oropharynx.  Lymph:  No palpable cervical, supraclavicular, or axillary LAD.  Chest:  CTA bilaterally.  No wheezes or crackles.  CV:  RRR.  Nl S1 and S2.  No m/r/g.  Breast:  Bilateral breasts are of increased fibroglandular density.  There is hyperpigmentation and increased overall density of the right breast in the distribution of the radiation field.  There are no discretely palpable masses in either breast.  Bilateral nipples are everted.  No nipple discharge.  Abd:  Soft/NT/ND.  BSs normoactive.  No hepatosplenomegaly.  Ext:  No pitting edema of the bilateral lower extremities.  Pulses 2+ and symmetric.  Musculo:  Strength 5/5 throughout.  Neuro:  Cranial nerves grossly intact.  Gait stable.  Psych:  Mood and affect appear normal.    Laboratory/Imaging Studies:  7/23/18 Bilateral screening mammogram:  Possible asymmetry in the left breast at approximately 3:00 posterior on the left.    7/23/18 Left breast ultrasound:  1 cm benign appearing cyst in area of asymmetry seen on mammogram.  Other small cysts are seen as well.    ASSESSMENT/PLAN:  April is a 39 yo premenopausal female with a stage 0, [id number], low-grade, 3 mm, cribriform, papillary, and solid type, ER positive, PA positive, DCIS of the right breast.  She completed right breast radiation in " 01/2017.     1.  Right breast DCIS/Left breast PAS:  April is 1 year 10 months out from excision of right breast DCIS.  There is no evidence of disease recurrence on clinical exam performed today.  Bilateral mammograms showed an asymmetry in the left breast, however, ultrasound confirmed this was a cyst.  We discussed that periareolar pruritis is likely due to evolving surgical change/nerve regeneration.  I will see her back in 6 months for her next visit.  Given increased breast density and young age at diagnosis, we are screening with both annual mammogram with tomosynthesis and breast MRI spaced so that she is having some form of imaging every 6 months.  She will be due for breast MRI at the time of her return visit in 6 months.    2.  Depression/Anxiety:  Improved since last visit.  Continue wellbutrin 300 mg daily.      3.  Right groin pain:  I suspect this is secondary to right hip arthritis.  I recommended she discuss with her PCP as further evaluation with an X-ray or perhaps a physical therapy referral may be helpful.  Likewise I recommended she discuss her family history and concerns for cardiovascular disease with her PCP.    4.  Vibratory sensation:  Unclear etiology.  Symptom is infrequent and currently not present.  Recommended observation for now.  Suggested she keep a diary to determine if linked to specific activity.    5.  Follow Up:  Return in approximately 6 months for visit with me.  Breast MRI 1-2 business days prior to return visit.    It was a pleasure to see April in clinic today.  A total of 20 minutes of our 25 minute face to face visit was spent in counseling.            Again, thank you for allowing me to participate in the care of your patient.      Sincerely,    Valerie Delgado MD

## 2018-07-23 NOTE — MR AVS SNAPSHOT
After Visit Summary   7/23/2018    Soraida Shen    MRN: [id number]           Patient Information     Date Of Birth          1980        Visit Information        Provider Department      7/23/2018 5:15 PM Valerie Delgado MD [facility]        Today's Diagnoses     Ductal carcinoma in situ (DCIS) of right breast    -  1    Breast cancer screening, high risk patient        Dense breast tissue on mammogram           Follow-ups after your visit        Your next 10 appointments already scheduled     Jan 17, 2019 11:00 AM CST   MR BREAST BILATERAL W/O & W CONTRAST with [facility] ([facility])    [address]  1st Floor  [facility] [facility] [address] [phone]           Take your medicines as usual, unless your doctor tells you not to. Bring a list of your current medicines to your exam (including vitamins, minerals and over-the-counter drugs).  The timing of your exam may depend on the start of your last period. If you re in menopause, you may have the exam anytime.  Please bring any previous mammograms or breast MRIs from other facilities to the MRI dept. Do not mail these items to us.   You will have IV contrast for this exam.  You do not need to do anything special to prepare.  The MRI machine uses a strong magnet. Please wear clothes without metal (snaps, zippers). A sweatsuit works well, or we may give you a hospital gown.  Please remove any body piercings and hair extensions before you arrive. You will also remove watches, jewelry, hairpins, wallets, dentures, partial dental plates and hearing aids. You may wear contact lenses, and you may be able to wear your rings. We have a safe place to keep your personal items, but it is safer to leave them at home.  **IMPORTANT** THE INSTRUCTIONS BELOW ARE ONLY FOR THOSE PATIENTS WHO HAVE BEEN PRESCRIBED SEDATION OR GENERAL ANESTHESIA DURING THEIR MRI PROCEDURE:  IF  YOUR DOCTOR PRESCRIBED ORAL SEDATION (take medicine to help you relax during your exam):   You must get the medicine from your doctor (oral medication) before you arrive. Bring the medicine to the exam. Do not take it at home. You ll be told when to take it upon arriving for your exam.   Arrive one hour early. Bring someone who can take you home after the test. Your medicine will make you sleepy. After the exam, you may not drive, take a bus or take a taxi by yourself.  IF YOUR DOCTOR PRESCRIBED IV SEDATION:   Arrive one hour early. Bring someone who can take you home after the test. Your medicine will make you sleepy. After the exam, you may not drive, take a bus or take a taxi by yourself.   No eating 6 hours before your exam. You may have clear liquids up until 4 hours before your exam. (Clear liquids include water, clear tea, black coffee and fruit juice without pulp.)  IF YOUR DOCTOR PRESCRIBED ANESTHESIA (be asleep for your exam):   Arrive 1 1/2 hours early. Bring someone who can take you home after the test. You may not drive, take a bus or take a taxi by yourself.   No eating 8 hours before your exam. You may have clear liquids up until 4 hours before your exam. (Clear liquids include water, clear tea, black coffee and fruit juice without pulp.)   You will spend four to five hours in the recovery room.  If you have any questions, please contact your Imaging Department exam site.            Jan 21, 2019  5:45 PM CST   (Arrive by 5:30 PM)   Return Visit with Valerie Delgado MD   [facility] ([facility])    [address]  [address]  [place] [address] [phone]              Who to contact     If you have questions or need follow up information about today's clinic visit or your schedule please contact [facility] directly at [phone].  Normal or non-critical lab and imaging results will be communicated to you by  "MyChart, letter or phone within 4 business days after the clinic has received the results. If you do not hear from us within 7 days, please contact the clinic through [employer] or phone. If you have a critical or abnormal lab result, we will notify you by phone as soon as possible.  Submit refill requests through [employer] or call your pharmacy and they will forward the refill request to us. Please allow 3 business days for your refill to be completed.          Additional Information About Your Visit        [employer] Information     [employer] gives you secure access to your electronic health record. If you see a primary care provider, you can also send messages to your care team and make appointments. If you have questions, please call your primary care clinic.  If you do not have a primary care provider, please call [phone] and they will assist you.        Care EveryWhere ID     This is your Care EveryWhere ID. This could be used by other organizations to access your [place] medical records  [id number]        Your Vitals Were     Pulse Temperature Respirations Height Pulse Oximetry Breastfeeding?    71 98.2  F (36.8  C) (Oral) 16 1.645 m (5' 4.75\") 96% No    BMI (Body Mass Index)                   27.5 kg/m2            Blood Pressure from Last 3 Encounters:   07/23/18 100/62   01/16/18 127/87   01/05/18 96/59    Weight from Last 3 Encounters:   07/23/18 74.4 kg (164 lb)   01/16/18 78.5 kg (173 lb)   12/19/17 80.3 kg (177 lb)               Primary Care Provider Office Phone # Fax #    Bita Perez [phone]       [address] [address]        Equal Access to Services     [facility] : Hadii ofelia Aaron, shane colon, karlee dominguez. So [facility] [phone].    ATENCIÓN: Si habla español, tiene a alfred disposición servicios gratuitos de asistencia lingüística. Llame al [phone].    We " comply with applicable federal civil rights laws and [place] laws. We do not discriminate on the basis of race, color, national origin, age, disability, sex, sexual orientation, or gender identity.            Thank you!     Thank you for choosing [facility]  for your care. Our goal is always to provide you with excellent care. Hearing back from our patients is one way we can continue to improve our services. Please take a few minutes to complete the written survey that you may receive in the mail after your visit with us. Thank you!             Your Updated Medication List - Protect others around you: Learn how to safely use, store and throw away your medicines at www.disposemymeds.org.          This list is accurate as of 7/23/18 11:59 PM.  Always use your most recent med list.                   Brand Name Dispense Instructions for use Diagnosis    buPROPion 300 MG 24 hr tablet    WELLBUTRIN XL    60 tablet    Take 1 tablet (300 mg) by mouth every morning See primary care doctor for additional refills.    Adjustment disorder with depressed mood       cabergoline 0.5 MG tablet    DOSTINEX    12 tablet    Take 0.5 tablets (0.25 mg) by mouth twice a week    Pituitary tumor       * HYDROcodone-acetaminophen 5-325 MG per tablet    NORCO    15 tablet    Take 1-2 tablets by mouth every 6 hours as needed for other (Moderate to Severe Pain)    S/P lumpectomy of breast       * HYDROcodone-acetaminophen 5-325 MG per tablet    NORCO    20 tablet    Take 1-2 tablets by mouth every 4 hours as needed for moderate to severe pain    Post-op pain       LORazepam 1 MG tablet    ATIVAN    5 tablet    Take 1 tablet (1 mg) by mouth every 8 hours as needed for anxiety (for flying)    Fear of flying       senna 8.6 MG tablet    SENOKOT    50 tablet    Take 1 tablet by mouth daily    S/P lumpectomy of breast       * Notice:  This list has 2 medication(s) that are the same as other medications prescribed for you. Read  the directions carefully, and ask your doctor or other care provider to review them with you.

## 2018-07-23 NOTE — LETTER
7/23/2018      RE: Soraida Shen  [address]  [place] [facility] [address]       Oncology Follow Up/Preoperative physical:  Date on this visit: 7/23/2018    Diagnosis:  [id number], low-grade, ER/MS positive, solid and cribriform type, right breast DCIS.    Primary Physician: Bita Perez     History Of Present Illness:  Ms. Shen is a 38 year old female with a h/o pituitary microadenoma with right breast DCIS.  Ms. Shen first noted a clear/yellowish discharge from the right nipple in the fall of 2015.  Mammogram showed no concerning findings.  Right breast ultrasound showed prominent ducts in the subareolar right breast.  Ductogram was attempted but was unsuccessful.  Contrast enhanced mammogram showed a 9 mm mass in the lateral right breast at the 7 o'clock position, 8-9 cm from the nipple.  Ultrasound showed dilated ducts with filling defects at the 8:30 position, 5 cm from the nipple.  The most prominent filling defect measured 1.2 cm.  Right breast biopsy showed intraductal papilloma without atypia.  She was referred to see a surgeon.  She was told that one of our surgeons removes these and the other does not.  Because she was told that one of our surgeons does not remove them, she did not feel it was important to see a surgeon.      In the summer of 2016, she saw her PCP, who recommended she follow through with seeing a surgeon.  Repeat right breast ultrasound showed mass at the area of previous biopsy to measure 6 mm and was felt to be unchanged from prior.  On 9/28/16, right breast excisional biopsy was performed by Dr. Dorantes.  Pathology showed low grade, papillary, solid, and cribriform type DCIS measuring 3 mm.  No comedonecrosis.  Surgical margin was negative, but <1 mm.  Estrogen and progesterone receptor staining were positive in >95% of tumor cells.  The focus of DCIS was within intraductal papilloma.  She received 4240 cGy in 16 fractions (12/13/16-1/4/17) to the right breast and boost of 1000 cGy in 4  fractions (1/5/17-1/12/17) to the tumor site.  She declined adjuvant endocrine therapy due to h/o mood disorder.    Breast MRI in 01/2018 showed a small area of enhancement in the posterior left breast.  Biopsy was c/w PASH and <1 mm papilloma without atypia.  Surgical excision on 1/5/18 showed two small intraductal papillomas, 1 mm in greatest dimension, and a detached intraductal fragment of epithelium showing usual ductal hyperplasia.    Ms. Shen had genetic counseling and BreastNext testing returned negative.     Interval History:  Ms. Shen comes into clinic today for routine breast cancer followup.  She is currently without concerns or complaints.  She specifically denies breast lumps or masses.  She has intermittent pruritus of her left breast lumpectomy excision.  She had annual mammogram today during which a left breast ultrasound was recommended. Fortunately, the ultrasound was suggestive of benign cyst.  She has 2 symptoms which are concerning to her.  One, is intermittent groin pain; this tends to come and go.  It is not bothersome enough for her to take pain medicine and does not keep her up from sleep at night.  She states that she has a family history of cardiac disease and is worried that this might be a sign or symptom of cardiovascular disease.  The other symptom is an intermittent buzzing feeling in her body.  She states that when she was a child she used to lie down at night and have a sensation of whole body vibration/buzzing and that her body was being pulled down into the bed.  This resolved, but then a few weeks ago on a trip to [place] after she had been walking quite a bit, she developed the same sensation in her right calf.  The sensation has resolved and has not returned.  She has no cough, shortness of breath or chest pain.  She has no swelling of her lower extremities.  She denies new bone or joint aches or pains.  She has no abdominal complaints.  She has no headache, visual changes  or focal neurologic complaints.  The remainder of a complete 12-point review of systems was completed and was negative with the exception of that mentioned above.       Past Medical/Surgical History:  Past Medical History:   Diagnosis Date     Anxiety      Breast CA (H)      Depression      Intraductal papilloma of breast 2015    right; biopsy proven 11/25/15     Peptic ulcer disease      Pituitary microadenoma (H) 2010    6 mm      Prolactinoma (H)     6 mm pituitary mass     Past Surgical History:   Procedure Laterality Date     LUMPECTOMY BREAST Right 9/28/2016    Procedure: LUMPECTOMY BREAST;  Surgeon: Andreia Dorantes MD;  [facility]     LUMPECTOMY BREAST Left 1/5/2018    Procedure: LUMPECTOMY BREAST;  Left Wire Localized Lumpectomy;  Surgeon: Andreia Dorantes MD;  [facility]     wisdom teeth extraction       Allergies:  Allergies as of 07/23/2018 - Nacho as Reviewed 01/16/2018   Allergen Reaction Noted     Fruit & vegetable Hives 09/06/2016     Current Medications:  Current Outpatient Prescriptions   Medication Sig Dispense Refill     buPROPion (WELLBUTRIN XL) 300 MG 24 hr tablet Take 1 tablet (300 mg) by mouth every morning See primary care doctor for additional refills. 60 tablet 3     cabergoline (DOSTINEX) 0.5 MG tablet Take 0.5 tablets (0.25 mg) by mouth twice a week 12 tablet 2     HYDROcodone-acetaminophen (NORCO) 5-325 MG per tablet Take 1-2 tablets by mouth every 4 hours as needed for moderate to severe pain (Patient not taking: Reported on 1/16/2018) 20 tablet 0     HYDROcodone-acetaminophen (NORCO) 5-325 MG per tablet Take 1-2 tablets by mouth every 6 hours as needed for other (Moderate to Severe Pain) (Patient not taking: Reported on 1/16/2018) 15 tablet 0     LORazepam (ATIVAN) 1 MG tablet Take 1 tablet (1 mg) by mouth every 8 hours as needed for anxiety (for flying) (Patient not taking: Reported on 1/16/2018) 5 tablet 0     senna (SENOKOT) 8.6 MG tablet Take 1 tablet by mouth  "daily (Patient not taking: Reported on 1/16/2018) 50 tablet 0      Family History and Social History:  Reviewed and unchanged from prior.  Please see initial consultation dated 2/13/17 for further details.    Physical Exam:  /62  Pulse 71  Temp 98.2  F (36.8  C) (Oral)  Resp 16  Ht 1.645 m (5' 4.75\")  Wt 74.4 kg (164 lb)  SpO2 96%  Breastfeeding? No  BMI 27.5 kg/m2  General:  Well appearing, well-nourished adult female in NAD.  HEENT:  Normocephalic.  Sclera anicteric.  MMM.  No lesions of the oropharynx.  Lymph:  No palpable cervical, supraclavicular, or axillary LAD.  Chest:  CTA bilaterally.  No wheezes or crackles.  CV:  RRR.  Nl S1 and S2.  No m/r/g.  Breast:  Bilateral breasts are of increased fibroglandular density.  There is hyperpigmentation and increased overall density of the right breast in the distribution of the radiation field.  There are no discretely palpable masses in either breast.  Bilateral nipples are everted.  No nipple discharge.  Abd:  Soft/NT/ND.  BSs normoactive.  No hepatosplenomegaly.  Ext:  No pitting edema of the bilateral lower extremities.  Pulses 2+ and symmetric.  Musculo:  Strength 5/5 throughout.  Neuro:  Cranial nerves grossly intact.  Gait stable.  Psych:  Mood and affect appear normal.    Laboratory/Imaging Studies:  7/23/18 Bilateral screening mammogram:  Possible asymmetry in the left breast at approximately 3:00 posterior on the left.    7/23/18 Left breast ultrasound:  1 cm benign appearing cyst in area of asymmetry seen on mammogram.  Other small cysts are seen as well.    ASSESSMENT/PLAN:  April is a 37 yo premenopausal female with a stage 0, [id number], low-grade, 3 mm, cribriform, papillary, and solid type, ER positive, MA positive, DCIS of the right breast.  She completed right breast radiation in 01/2017.     1.  Right breast DCIS/Left breast PASH:  April is 1 year 10 months out from excision of right breast DCIS.  There is no evidence of disease " recurrence on clinical exam performed today.  Bilateral mammograms showed an asymmetry in the left breast, however, ultrasound confirmed this was a cyst.  We discussed that periareolar pruritis is likely due to evolving surgical change/nerve regeneration.  I will see her back in 6 months for her next visit.  Given increased breast density and young age at diagnosis, we are screening with both annual mammogram with tomosynthesis and breast MRI spaced so that she is having some form of imaging every 6 months.  She will be due for breast MRI at the time of her return visit in 6 months.    2.  Depression/Anxiety:  Improved since last visit.  Continue wellbutrin 300 mg daily.      3.  Right groin pain:  I suspect this is secondary to right hip arthritis.  I recommended she discuss with her PCP as further evaluation with an X-ray or perhaps a physical therapy referral may be helpful.  Likewise I recommended she discuss her family history and concerns for cardiovascular disease with her PCP.    4.  Vibratory sensation:  Unclear etiology.  Symptom is infrequent and currently not present.  Recommended observation for now.  Suggested she keep a diary to determine if linked to specific activity.    5.  Follow Up:  Return in approximately 6 months for visit with me.  Breast MRI 1-2 business days prior to return visit.    It was a pleasure to see April in clinic today.  A total of 20 minutes of our 25 minute face to face visit was spent in counseling.            Valerie Delgado MD

## 2018-07-30 DIAGNOSIS — D49.7 PITUITARY TUMOR: ICD-10-CM

## 2018-07-30 PROCEDURE — 36415 COLL VENOUS BLD VENIPUNCTURE: CPT

## 2018-07-30 PROCEDURE — 84146 ASSAY OF PROLACTIN: CPT

## 2018-07-31 LAB — PROLACTIN SERPL-MCNC: 6 UG/L (ref 3–27)

## 2018-10-31 DIAGNOSIS — D49.7 PITUITARY TUMOR: ICD-10-CM

## 2018-11-01 NOTE — TELEPHONE ENCOUNTER
cabergoline (DOSTINEX) 0.5 MG tablet    Last Written Prescription Date:  7/23/18  Last Fill Quantity: 12,   # refills: 0  Last Office Visit : 9/5/17  Future Office visit:  None    Routing refill request to provider for review/approval because: provider preference

## 2018-11-02 RX ORDER — CABERGOLINE 0.5 MG/1
0.25 TABLET ORAL
Qty: 12 TABLET | Refills: 0 | Status: SHIPPED | OUTPATIENT
Start: 2018-11-05

## 2019-01-25 ENCOUNTER — ANCILLARY PROCEDURE (OUTPATIENT)
Dept: MRI IMAGING | Facility: CLINIC | Age: 39
End: 2019-01-25
Payer: COMMERCIAL

## 2019-01-25 DIAGNOSIS — R92.30 DENSE BREAST TISSUE ON MAMMOGRAM: ICD-10-CM

## 2019-01-25 DIAGNOSIS — Z12.39 BREAST CANCER SCREENING, HIGH RISK PATIENT: ICD-10-CM

## 2019-01-25 RX ORDER — GADOBUTROL 604.72 MG/ML
7.5 INJECTION INTRAVENOUS ONCE
Status: COMPLETED | OUTPATIENT
Start: 2019-01-25 | End: 2019-01-25

## 2019-01-25 RX ADMIN — GADOBUTROL 7 ML: 604.72 INJECTION INTRAVENOUS at 16:32

## 2019-01-25 NOTE — DISCHARGE INSTRUCTIONS
MRI Contrast Discharge Instructions    The IV contrast you received today will pass out of your body in your  urine. This will happen in the next 24 hours. You will not feel this process.  Your urine will not change color.    Drink at least 4 extra glasses of water or juice today (unless your doctor  has restricted your fluids). This reduces the stress on your kidneys.  You may take your regular medicines.    If you are on dialysis: It is best to have dialysis today.    If you have a reaction: Most reactions happen right away. If you have  any new symptoms after leaving the hospital (such as hives or swelling),  call your hospital at the correct number below. Or call your family doctor.  If you have breathing distress or wheezing, call 911.    Special instructions: ***    I have read and understand the above information.    Signature:______________________________________ Date:___________    Staff:__________________________________________ Date:___________     Time:__________    [place] [facility] Departments:    ___[place]: [phone]  ___[facility]: [phone]  ___[place]: [phone] ___[facility]: [phone]  ___[facility]: [phone]  ___[facility]: [phone]  ___[phone]  ___[facility]: [phone]  ___[phone]

## 2019-07-01 DIAGNOSIS — R92.30 DENSE BREAST TISSUE ON MAMMOGRAM: ICD-10-CM

## 2019-07-01 DIAGNOSIS — Z12.39 BREAST CANCER SCREENING, HIGH RISK PATIENT: Primary | ICD-10-CM

## 2019-08-14 ENCOUNTER — ANCILLARY PROCEDURE (OUTPATIENT)
Dept: MAMMOGRAPHY | Facility: CLINIC | Age: 39
End: 2019-08-14
Attending: INTERNAL MEDICINE
Payer: COMMERCIAL

## 2019-08-14 DIAGNOSIS — Z12.39 BREAST CANCER SCREENING, HIGH RISK PATIENT: ICD-10-CM

## 2019-08-14 DIAGNOSIS — R92.30 DENSE BREAST TISSUE ON MAMMOGRAM: ICD-10-CM

## 2019-09-28 ENCOUNTER — HEALTH MAINTENANCE LETTER (OUTPATIENT)
Age: 39
End: 2019-09-28

## 2020-03-26 DIAGNOSIS — Z12.39 BREAST CANCER SCREENING, HIGH RISK PATIENT: Primary | ICD-10-CM

## 2020-03-26 DIAGNOSIS — Z85.3 PERSONAL HISTORY OF MALIGNANT NEOPLASM OF BREAST: ICD-10-CM

## 2020-05-19 ENCOUNTER — ANCILLARY PROCEDURE (OUTPATIENT)
Dept: MRI IMAGING | Facility: CLINIC | Age: 40
End: 2020-05-19
Attending: INTERNAL MEDICINE
Payer: COMMERCIAL

## 2020-05-19 DIAGNOSIS — Z85.3 PERSONAL HISTORY OF MALIGNANT NEOPLASM OF BREAST: ICD-10-CM

## 2020-05-19 DIAGNOSIS — Z12.39 BREAST CANCER SCREENING, HIGH RISK PATIENT: ICD-10-CM

## 2020-05-19 RX ORDER — GADOBUTROL 604.72 MG/ML
7.5 INJECTION INTRAVENOUS ONCE
Status: COMPLETED | OUTPATIENT
Start: 2020-05-19 | End: 2020-05-19

## 2020-05-19 RX ADMIN — GADOBUTROL 7.5 ML: 604.72 INJECTION INTRAVENOUS at 10:45

## 2020-05-19 NOTE — DISCHARGE INSTRUCTIONS
MRI Contrast Discharge Instructions    The IV contrast you received today will pass out of your body in your  urine. This will happen in the next 24 hours. You will not feel this process.  Your urine will not change color.    Drink at least 4 extra glasses of water or juice today (unless your doctor  has restricted your fluids). This reduces the stress on your kidneys.  You may take your regular medicines.    If you are on dialysis: It is best to have dialysis today.    If you have a reaction: Most reactions happen right away. If you have  any new symptoms after leaving the hospital (such as hives or swelling),  call your hospital at the correct number below. Or call your family doctor.  If you have breathing distress or wheezing, call 911.    Special instructions: ***    I have read and understand the above information.    Signature:______________________________________ Date:___________    Staff:__________________________________________ Date:___________     Time:__________    [place] [facility] Departments:    ___[place]: [phone]  ___[facility]: [phone]  ___[place]: [phone] ___[facility]: [phone]  ___[facility]: [phone]  ___[facility]: [phone]  ___[phone]  ___[facility]: [phone]  ___[phone]

## 2020-09-25 ENCOUNTER — ANCILLARY PROCEDURE (OUTPATIENT)
Dept: MAMMOGRAPHY | Facility: CLINIC | Age: 40
End: 2020-09-25
Attending: INTERNAL MEDICINE
Payer: COMMERCIAL

## 2020-09-25 DIAGNOSIS — Z12.31 VISIT FOR SCREENING MAMMOGRAM: ICD-10-CM

## 2021-01-10 ENCOUNTER — HEALTH MAINTENANCE LETTER (OUTPATIENT)
Age: 41
End: 2021-01-10

## 2021-03-29 DIAGNOSIS — Z12.39 BREAST CANCER SCREENING, HIGH RISK PATIENT: Primary | ICD-10-CM

## 2021-03-29 DIAGNOSIS — Z85.3 PERSONAL HISTORY OF MALIGNANT NEOPLASM OF BREAST: ICD-10-CM

## 2021-05-20 ENCOUNTER — ANCILLARY PROCEDURE (OUTPATIENT)
Dept: MRI IMAGING | Facility: CLINIC | Age: 41
End: 2021-05-20
Attending: INTERNAL MEDICINE
Payer: COMMERCIAL

## 2021-05-20 DIAGNOSIS — Z12.39 BREAST CANCER SCREENING, HIGH RISK PATIENT: ICD-10-CM

## 2021-05-20 DIAGNOSIS — Z85.3 PERSONAL HISTORY OF MALIGNANT NEOPLASM OF BREAST: ICD-10-CM

## 2021-05-20 PROCEDURE — A9585 GADOBUTROL INJECTION: HCPCS | Performed by: RADIOLOGY

## 2021-05-20 PROCEDURE — 77049 MRI BREAST C-+ W/CAD BI: CPT | Mod: GC | Performed by: RADIOLOGY

## 2021-05-20 RX ORDER — GADOBUTROL 604.72 MG/ML
7.5 INJECTION INTRAVENOUS ONCE
Status: COMPLETED | OUTPATIENT
Start: 2021-05-20 | End: 2021-05-20

## 2021-05-20 RX ADMIN — GADOBUTROL 7.5 ML: 604.72 INJECTION INTRAVENOUS at 15:09

## 2021-05-20 NOTE — DISCHARGE INSTRUCTIONS
MRI Contrast Discharge Instructions    The IV contrast you received today will pass out of your body in your  urine. This will happen in the next 24 hours. You will not feel this process.  Your urine will not change color.    Drink at least 4 extra glasses of water or juice today (unless your doctor  has restricted your fluids). This reduces the stress on your kidneys.  You may take your regular medicines.    If you are on dialysis: It is best to have dialysis today.    If you have a reaction: Most reactions happen right away. If you have  any new symptoms after leaving the hospital (such as hives or swelling),  call your hospital at the correct number below. Or call your family doctor.  If you have breathing distress or wheezing, call 911.    Special instructions: ***    I have read and understand the above information.    Signature:______________________________________ Date:___________    Staff:__________________________________________ Date:___________     Time:__________    [place] [facility] Departments:    ___[place]: [phone]  ___[facility]: [phone]  ___[place]: [phone] ___[facility]: [phone]  ___[facility]: [phone]  ___[facility]: [phone]  ___[phone]  ___[facility]: [phone]  ___[phone]

## 2021-06-20 NOTE — PROGRESS NOTES
Oncology Follow Up:  Date on this visit: 6/21/2021    Diagnosis:  [id number], low-grade, ER/AK positive, solid and cribriform type, right breast DCIS.    Primary Physician: Bita Perez     History Of Present Illness:  Ms. Shen is a 41 year old female with a h/o pituitary microadenoma with right breast DCIS.  Ms. Shen first noted a clear/yellowish discharge from the right nipple in the fall of 2015.  Mammogram showed no concerning findings.  Right breast ultrasound showed prominent ducts in the subareolar right breast.  Ductogram was attempted but was unsuccessful.  Contrast enhanced mammogram showed a 9 mm mass in the lateral right breast at the 7 o'clock position, 8-9 cm from the nipple.  Ultrasound showed dilated ducts with filling defects at the 8:30 position, 5 cm from the nipple.  The most prominent filling defect measured 1.2 cm.  Right breast biopsy showed intraductal papilloma without atypia.  She was referred to see a surgeon.  She was told that one of our surgeons removes these and the other does not.  Because she was told that one of our surgeons does not remove them, she did not feel it was important to see a surgeon.      In the summer of 2016, she saw her PCP, who recommended she follow through with seeing a surgeon.  Repeat right breast ultrasound showed mass at the area of previous biopsy to measure 6 mm and was felt to be unchanged from prior.  On 9/28/16, right breast excisional biopsy was performed by Dr. Dorantes.  Pathology showed low grade, papillary, solid, and cribriform type DCIS measuring 3 mm.  No comedonecrosis.  Surgical margin was negative, but <1 mm.  Estrogen and progesterone receptor staining were positive in >95% of tumor cells.  The focus of DCIS was within intraductal papilloma.  She received 4240 cGy in 16 fractions (12/13/16-1/4/17) to the right breast and boost of 1000 cGy in 4 fractions (1/5/17-1/12/17) to the tumor site.  She declined adjuvant endocrine therapy due to h/o mood  disorder.    Breast MRI in 01/2018 showed a small area of enhancement in the posterior left breast.  Biopsy was c/w PASH and <1 mm papilloma without atypia.  Surgical excision on 1/5/18 showed two small intraductal papillomas, 1 mm in greatest dimension, and a detached intraductal fragment of epithelium showing usual ductal hyperplasia.    Ms. Shen had genetic counseling and BreastNext testing returned negative.     Interval History:  Ms. Shen was seen in clinic today for routine DCIS follow-up.  Of note, it has been approximately 3 years since her last visit.  In general, her health has been good.  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  Ultimately she ended up losing her job which triggered her to take better care of herself.  During that time she had increased depress and was eating and drinking too much.  She gained weight.  She subsequently made a decision to focus on her own well being.  More recently, she has been exercising and doing weight watchers.  She has lost weight and feels much better overall.  She denies concerning lumps or masses of either breast.  She has no breast pain or swelling.  There was a period of time a few months ago when she was having urgent stools and was very concerned for potential stool incontinence.  Since starting weight watchers and losing weight, this seems to have resolved.  She has no current abdominal complaints.  She denies cough, shortness of breath, or chest pain.  She has no headaches or focal neurologic complaints.  No current bone or joint aches or pains.  She is hoping to move to the [place] in January in order to pursue a Masters in disaster relief.  The remainder of a complete 12 point review of systems is reviewed with patient was negative with exception that mentioned above.    Past Medical/Surgical History:  Past Medical History:   Diagnosis Date     Anxiety      Breast CA (H)      Depression      Intraductal papilloma  of breast 2015    right; biopsy proven 11/25/15     Peptic ulcer disease      Pituitary microadenoma (H) 2010    6 mm      Prolactinoma (H)     6 mm pituitary mass     Past Surgical History:   Procedure Laterality Date     LUMPECTOMY BREAST Right 9/28/2016    Procedure: LUMPECTOMY BREAST;  Surgeon: Andreia Dorantes MD;  Location: [facility] OR     LUMPECTOMY BREAST Left 1/5/2018    Procedure: LUMPECTOMY BREAST;  Left Wire Localized Lumpectomy;  Surgeon: Andreia Dorantes MD;  Location:  OR     wisdom teeth extraction       Allergies:  Allergies as of 06/21/2021 - Reviewed 07/23/2018   Allergen Reaction Noted     Fruit & vegetable Hives 09/06/2016     Current Medications:  Current Outpatient Medications   Medication Sig Dispense Refill     buPROPion (WELLBUTRIN XL) 300 MG 24 hr tablet Take 1 tablet (300 mg) by mouth every morning See primary care doctor for additional refills. 60 tablet 3     cabergoline (DOSTINEX) 0.5 MG tablet Take 0.5 tablets (0.25 mg) by mouth twice a week 12 tablet 0     HYDROcodone-acetaminophen (NORCO) 5-325 MG per tablet Take 1-2 tablets by mouth every 4 hours as needed for moderate to severe pain (Patient not taking: Reported on 1/16/2018) 20 tablet 0     HYDROcodone-acetaminophen (NORCO) 5-325 MG per tablet Take 1-2 tablets by mouth every 6 hours as needed for other (Moderate to Severe Pain) (Patient not taking: Reported on 1/16/2018) 15 tablet 0     LORazepam (ATIVAN) 1 MG tablet Take 1 tablet (1 mg) by mouth every 8 hours as needed for anxiety (for flying) (Patient not taking: Reported on 1/16/2018) 5 tablet 0     senna (SENOKOT) 8.6 MG tablet Take 1 tablet by mouth daily (Patient not taking: Reported on 1/16/2018) 50 tablet 0      Family History and Social History:  Reviewed and unchanged from prior.  Please see initial consultation dated 2/13/17 for further details.    Physical Exam:  /71 (BP Location: Right arm, Patient Position: Sitting, Cuff Size: Adult Regular)   Pulse 62    Temp 98.7  F (37.1  C) (Oral)   SpO2 99%   General:  Well appearing adult female in NAD.  Alert and oriented.  HEENT:  Normocephalic.  Sclera anicteric.  MMM.  No lesions of the oropharynx.  Lymph:  No palpable cervical, supraclavicular, or axillary LAD.  Chest:  CTA bilaterally.  No wheezes or crackles.  CV:  RRR.  Nl S1 and S2.  No m/r/g.  Breast:  Bilateral breasts are of increased fibroglandular density.  Bilateral inferior breast scars.  There is subcentimeter, tender, palpable mass left breast, 11:00, 10.5 cm from the nipple.  No other discretely palpable masses in either breast.  Bilateral nipples are everted.  No nipple discharge.  Abd:  Soft/ND.    Ext:  No pitting edema of the bilateral lower extremities.   Musculo:  LUE ROM limited abduction to shoulder level.  Neuro:  Cranial nerves grossly intact.  Gait stable.  Psych:  Mood and affect appear normal.    Laboratory/Imaging Studies:  5/20/2021 Breast MRI:  Heterogeneous fibroglandular tissue.  No suspicious enhancement or lymphadenopathy.    ASSESSMENT/PLAN:  April is a 42 yo premenopausal female with a stage 0, [id number], low-grade, 3 mm, cribriform, papillary, and solid type, ER positive, ND positive, DCIS of the right breast.  She completed right breast radiation in 01/2017.     1.  Right breast DCIS/Left breast PASH:  April is 4 years, 9 months out from excision of right breast DCIS.  There is no evidence of disease recurrence on exam performed today.  Breast MRI performed in May was reviewed and is without concerning findings.  On exam today, there is a small palpable mass upper inner left breast.  I suspect this is just a fibroglandular density, however, given it is much more prominent than surrounding breast tissue, will obtain a left breast ultrasound for further evaluation.    We discussed that given she is now almost 5 years out from excision of DCIS would be okay to discharge from oncology clinic.  Recommend continuing annual mammogram and  annual breast MRI spaced every 6 months.  She should also have an annual breast exam.  We discussed this could either be done with her PCP or in high risk breast clinic.  I will order mammogram due in September.  She declines referral to high risk breast clinic at this time, due to planned move to the .  She agrees to establish care with a provider there, or here if she does not make the move.    2.  Depression/Anxiety:  Continues on wellbutrin 300 mg daily.      3.  Weight management:  We reviewed weight loss, 150 minutes of cardiovascular exercise per week, and limited alcohol intake have been shown to reduce the risk of breast cancer.  She is losing weight on Weight Watchers and states she drinks very little on the Weight Watchers plan.  She confirms daily walks.    4.  Follow Up:  Left breast ultrasound within 1 week.  Bilateral screening mammograms 9/25/2021 or later.      35 minutes spent on the date of the encounter doing chart review, review of test results, interpretation of tests, patient visit and documentation.

## 2021-06-21 ENCOUNTER — ONCOLOGY VISIT (OUTPATIENT)
Dept: ONCOLOGY | Facility: CLINIC | Age: 41
End: 2021-06-21
Attending: INTERNAL MEDICINE
Payer: COMMERCIAL

## 2021-06-21 VITALS
SYSTOLIC BLOOD PRESSURE: 104 MMHG | DIASTOLIC BLOOD PRESSURE: 71 MMHG | TEMPERATURE: 98.7 F | OXYGEN SATURATION: 99 % | HEART RATE: 62 BPM

## 2021-06-21 DIAGNOSIS — Z85.3 PERSONAL HISTORY OF MALIGNANT NEOPLASM OF BREAST: Primary | ICD-10-CM

## 2021-06-21 DIAGNOSIS — N63.0 LUMP OR MASS IN BREAST: ICD-10-CM

## 2021-06-21 DIAGNOSIS — R92.30 DENSE BREAST TISSUE ON MAMMOGRAM: ICD-10-CM

## 2021-06-21 DIAGNOSIS — Z12.39 BREAST CANCER SCREENING, HIGH RISK PATIENT: ICD-10-CM

## 2021-06-21 PROCEDURE — 99214 OFFICE O/P EST MOD 30 MIN: CPT | Performed by: INTERNAL MEDICINE

## 2021-06-21 PROCEDURE — G0463 HOSPITAL OUTPT CLINIC VISIT: HCPCS

## 2021-06-21 RX ORDER — NAPROXEN 500 MG/1
TABLET ORAL
COMMUNITY
Start: 2021-03-27

## 2021-06-21 RX ORDER — LOPERAMIDE HCL 2 MG
CAPSULE ORAL
COMMUNITY
Start: 2021-01-22

## 2021-06-21 RX ORDER — CABERGOLINE 0.5 MG/1
0.25 TABLET ORAL
COMMUNITY
Start: 2020-06-16

## 2021-06-21 RX ORDER — HYDROXYZINE HYDROCHLORIDE 25 MG/1
25 TABLET, FILM COATED ORAL
COMMUNITY
Start: 2020-11-06

## 2021-06-21 RX ORDER — PSYLLIUM HUSK (WITH SUGAR) 3 G/12 G
POWDER (GRAM) ORAL
COMMUNITY
Start: 2021-01-12

## 2021-06-21 NOTE — LETTER
6/21/2021         RE: Soraida Shen  [address]  [facility] [id number]        Dear Colleague,    Thank you for referring your patient, Soraida Shen, to the [facility]. Please see a copy of my visit note below.    Oncology Follow Up:  Date on this visit: 6/21/2021    Diagnosis:  [id number], low-grade, ER/NV positive, solid and cribriform type, right breast DCIS.    Primary Physician: Bita Perez     History Of Present Illness:  Ms. Shen is a 41 year old female with a h/o pituitary microadenoma with right breast DCIS.  Ms. Shen first noted a clear/yellowish discharge from the right nipple in the fall of 2015.  Mammogram showed no concerning findings.  Right breast ultrasound showed prominent ducts in the subareolar right breast.  Ductogram was attempted but was unsuccessful.  Contrast enhanced mammogram showed a 9 mm mass in the lateral right breast at the 7 o'clock position, 8-9 cm from the nipple.  Ultrasound showed dilated ducts with filling defects at the 8:30 position, 5 cm from the nipple.  The most prominent filling defect measured 1.2 cm.  Right breast biopsy showed intraductal papilloma without atypia.  She was referred to see a surgeon.  She was told that one of our surgeons removes these and the other does not.  Because she was told that one of our surgeons does not remove them, she did not feel it was important to see a surgeon.      In the summer of 2016, she saw her PCP, who recommended she follow through with seeing a surgeon.  Repeat right breast ultrasound showed mass at the area of previous biopsy to measure 6 mm and was felt to be unchanged from prior.  On 9/28/16, right breast excisional biopsy was performed by Dr. Dorantes.  Pathology showed low grade, papillary, solid, and cribriform type DCIS measuring 3 mm.  No comedonecrosis.  Surgical margin was negative, but <1 mm.  Estrogen and progesterone receptor staining were positive in >95% of tumor cells.   The focus of DCIS was within intraductal papilloma.  She received 4240 cGy in 16 fractions (12/13/16-1/4/17) to the right breast and boost of 1000 cGy in 4 fractions (1/5/17-1/12/17) to the tumor site.  She declined adjuvant endocrine therapy due to h/o mood disorder.    Breast MRI in 01/2018 showed a small area of enhancement in the posterior left breast.  Biopsy was c/w PASH and <1 mm papilloma without atypia.  Surgical excision on 1/5/18 showed two small intraductal papillomas, 1 mm in greatest dimension, and a detached intraductal fragment of epithelium showing usual ductal hyperplasia.    Ms. Shen had genetic counseling and BreastNext testing returned negative.     Interval History:  Ms. Shen was seen in clinic today for routine DCIS follow-up.  Of note, it has been approximately 3 years since her last visit.  In general, her health has been good.  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  Ultimately she ended up losing her job which triggered her to take better care of herself.  During that time she had increased depress and was eating and drinking too much.  She gained weight.  She subsequently made a decision to focus on her own well being.  More recently, she has been exercising and doing weight watchers.  She has lost weight and feels much better overall.  She denies concerning lumps or masses of either breast.  She has no breast pain or swelling.  There was a period of time a few months ago when she was having urgent stools and was very concerned for potential stool incontinence.  Since starting weight watchers and losing weight, this seems to have resolved.  She has no current abdominal complaints.  She denies cough, shortness of breath, or chest pain.  She has no headaches or focal neurologic complaints.  No current bone or joint aches or pains.  She is hoping to move to the [place] in January in order to pursue a Masters in disaster relief.  The remainder of a  complete 12 point review of systems is reviewed with patient was negative with exception that mentioned above.    Past Medical/Surgical History:  Past Medical History:   Diagnosis Date     Anxiety      Breast CA (H)      Depression      Intraductal papilloma of breast 2015    right; biopsy proven 11/25/15     Peptic ulcer disease      Pituitary microadenoma (H) 2010    6 mm      Prolactinoma (H)     6 mm pituitary mass     Past Surgical History:   Procedure Laterality Date     LUMPECTOMY BREAST Right 9/28/2016    Procedure: LUMPECTOMY BREAST;  Surgeon: Andreia Dorantes MD;  Location: [facility] OR     LUMPECTOMY BREAST Left 1/5/2018    Procedure: LUMPECTOMY BREAST;  Left Wire Localized Lumpectomy;  Surgeon: Andreia Dorantes MD;  Location: [facility] OR     wisdom teeth extraction       Allergies:  Allergies as of 06/21/2021 - Reviewed 07/23/2018   Allergen Reaction Noted     Fruit & vegetable Hives 09/06/2016     Current Medications:  Current Outpatient Medications   Medication Sig Dispense Refill     buPROPion (WELLBUTRIN XL) 300 MG 24 hr tablet Take 1 tablet (300 mg) by mouth every morning See primary care doctor for additional refills. 60 tablet 3     cabergoline (DOSTINEX) 0.5 MG tablet Take 0.5 tablets (0.25 mg) by mouth twice a week 12 tablet 0     HYDROcodone-acetaminophen (NORCO) 5-325 MG per tablet Take 1-2 tablets by mouth every 4 hours as needed for moderate to severe pain (Patient not taking: Reported on 1/16/2018) 20 tablet 0     HYDROcodone-acetaminophen (NORCO) 5-325 MG per tablet Take 1-2 tablets by mouth every 6 hours as needed for other (Moderate to Severe Pain) (Patient not taking: Reported on 1/16/2018) 15 tablet 0     LORazepam (ATIVAN) 1 MG tablet Take 1 tablet (1 mg) by mouth every 8 hours as needed for anxiety (for flying) (Patient not taking: Reported on 1/16/2018) 5 tablet 0     senna (SENOKOT) 8.6 MG tablet Take 1 tablet by mouth daily (Patient not taking: Reported on 1/16/2018) 50 tablet 0       Family History and Social History:  Reviewed and unchanged from prior.  Please see initial consultation dated 2/13/17 for further details.    Physical Exam:  /71 (BP Location: Right arm, Patient Position: Sitting, Cuff Size: Adult Regular)   Pulse 62   Temp 98.7  F (37.1  C) (Oral)   SpO2 99%   General:  Well appearing adult female in NAD.  Alert and oriented.  HEENT:  Normocephalic.  Sclera anicteric.  MMM.  No lesions of the oropharynx.  Lymph:  No palpable cervical, supraclavicular, or axillary LAD.  Chest:  CTA bilaterally.  No wheezes or crackles.  CV:  RRR.  Nl S1 and S2.  No m/r/g.  Breast:  Bilateral breasts are of increased fibroglandular density.  Bilateral inferior breast scars.  There is subcentimeter, tender, palpable mass left breast, 11:00, 10.5 cm from the nipple.  No other discretely palpable masses in either breast.  Bilateral nipples are everted.  No nipple discharge.  Abd:  Soft/ND.    Ext:  No pitting edema of the bilateral lower extremities.   Musculo:  LUE ROM limited abduction to shoulder level.  Neuro:  Cranial nerves grossly intact.  Gait stable.  Psych:  Mood and affect appear normal.    Laboratory/Imaging Studies:  5/20/2021 Breast MRI:  Heterogeneous fibroglandular tissue.  No suspicious enhancement or lymphadenopathy.    ASSESSMENT/PLAN:  April is a 42 yo premenopausal female with a stage 0, [id number], low-grade, 3 mm, cribriform, papillary, and solid type, ER positive, FL positive, DCIS of the right breast.  She completed right breast radiation in 01/2017.     1.  Right breast DCIS/Left breast PASH:  April is 4 years, 9 months out from excision of right breast DCIS.  There is no evidence of disease recurrence on exam performed today.  Breast MRI performed in May was reviewed and is without concerning findings.  On exam today, there is a small palpable mass upper inner left breast.  I suspect this is just a fibroglandular density, however, given it is much more prominent than  surrounding breast tissue, will obtain a left breast ultrasound for further evaluation.    We discussed that given she is now almost 5 years out from excision of DCIS would be okay to discharge from oncology clinic.  Recommend continuing annual mammogram and annual breast MRI spaced every 6 months.  She should also have an annual breast exam.  We discussed this could either be done with her PCP or in high risk breast clinic.  I will order mammogram due in September.  She declines referral to high risk breast clinic at this time, due to planned move to the .  She agrees to establish care with a provider there, or here if she does not make the move.    2.  Depression/Anxiety:  Continues on wellbutrin 300 mg daily.      3.  Weight management:  We reviewed weight loss, 150 minutes of cardiovascular exercise per week, and limited alcohol intake have been shown to reduce the risk of breast cancer.  She is losing weight on Weight Watchers and states she drinks very little on the Weight Watchers plan.  She confirms daily walks.    4.  Follow Up:  Left breast ultrasound within 1 week.  Bilateral screening mammograms 9/25/2021 or later.      35 minutes spent on the date of the encounter doing chart review, review of test results, interpretation of tests, patient visit and documentation.                   Again, thank you for allowing me to participate in the care of your patient.        Sincerely,        Valerie Delgado MD

## 2021-06-21 NOTE — NURSING NOTE
"Oncology Rooming Note    June 21, 2021 8:21 AM   April Cindy Shen is a 41 year old female who presents for:    Chief Complaint   Patient presents with     Oncology Clinic Visit     Breast Cancer     Initial Vitals: /71 (BP Location: Right arm, Patient Position: Sitting, Cuff Size: Adult Regular)   Pulse 62   Temp 98.7  F (37.1  C) (Oral)   SpO2 99%  Estimated body mass index is 27.5 kg/m  as calculated from the following:    Height as of 7/23/18: 1.645 m (5' 4.75\").    Weight as of 7/23/18: 74.4 kg (164 lb). There is no height or weight on file to calculate BSA.  Data Unavailable Comment: Data Unavailable   No LMP recorded. (Menstrual status: IUD).  Allergies reviewed: Yes  Medications reviewed: Yes    Medications: Medication refills not needed today.  Pharmacy name entered into [facility]:    [facility] [address] [place] [address] [address] [place] [address] PHARMACY [facility], [place] - [address] [place] [address].    Clinical concerns: No screenings done due to provider starting appointment.        Meghann Kuo LPN               "

## 2021-06-23 ENCOUNTER — ANCILLARY PROCEDURE (OUTPATIENT)
Dept: MAMMOGRAPHY | Facility: CLINIC | Age: 41
End: 2021-06-23
Attending: INTERNAL MEDICINE
Payer: COMMERCIAL

## 2021-06-23 DIAGNOSIS — N63.0 LUMP OR MASS IN BREAST: ICD-10-CM

## 2021-06-23 PROCEDURE — 77065 DX MAMMO INCL CAD UNI: CPT | Performed by: RADIOLOGY

## 2021-06-23 PROCEDURE — G0279 TOMOSYNTHESIS, MAMMO: HCPCS | Performed by: RADIOLOGY

## 2021-06-28 ENCOUNTER — ANCILLARY PROCEDURE (OUTPATIENT)
Dept: MAMMOGRAPHY | Facility: CLINIC | Age: 41
End: 2021-06-28
Attending: INTERNAL MEDICINE
Payer: COMMERCIAL

## 2021-06-28 PROCEDURE — 76642 ULTRASOUND BREAST LIMITED: CPT | Mod: LT | Performed by: RADIOLOGY

## 2021-10-23 ENCOUNTER — HEALTH MAINTENANCE LETTER (OUTPATIENT)
Age: 41
End: 2021-10-23

## 2022-02-12 ENCOUNTER — HEALTH MAINTENANCE LETTER (OUTPATIENT)
Age: 42
End: 2022-02-12

## 2022-04-07 ENCOUNTER — ANCILLARY PROCEDURE (OUTPATIENT)
Dept: MAMMOGRAPHY | Facility: CLINIC | Age: 42
End: 2022-04-07
Attending: INTERNAL MEDICINE
Payer: COMMERCIAL

## 2022-04-07 DIAGNOSIS — Z12.39 BREAST CANCER SCREENING, HIGH RISK PATIENT: ICD-10-CM

## 2022-04-07 DIAGNOSIS — R92.30 DENSE BREAST TISSUE ON MAMMOGRAM: ICD-10-CM

## 2022-04-07 DIAGNOSIS — Z85.3 PERSONAL HISTORY OF MALIGNANT NEOPLASM OF BREAST: ICD-10-CM

## 2022-04-07 PROCEDURE — 77063 BREAST TOMOSYNTHESIS BI: CPT | Mod: GC | Performed by: RADIOLOGY

## 2022-04-07 PROCEDURE — 77067 SCR MAMMO BI INCL CAD: CPT | Mod: GC | Performed by: RADIOLOGY

## 2022-10-09 ENCOUNTER — HEALTH MAINTENANCE LETTER (OUTPATIENT)
Age: 42
End: 2022-10-09

## 2023-02-18 ENCOUNTER — HEALTH MAINTENANCE LETTER (OUTPATIENT)
Age: 43
End: 2023-02-18

## 2024-03-16 ENCOUNTER — HEALTH MAINTENANCE LETTER (OUTPATIENT)
Age: 44
End: 2024-03-16

## 2025-03-22 ENCOUNTER — HEALTH MAINTENANCE LETTER (OUTPATIENT)
Age: 45
End: 2025-03-22

## (undated) DEVICE — SU MONOCRYL 4-0 PS-2 27" UND Y426H

## (undated) DEVICE — DRSG STERI STRIP 1/2X4" R1547

## (undated) DEVICE — SOL NACL 0.9% IRRIG 1000ML BOTTLE 2F7124

## (undated) DEVICE — DRSG PRIMAPORE 03 1/8X6" 66000318

## (undated) DEVICE — PREP CHLORAPREP 26ML TINTED ORANGE  260815

## (undated) DEVICE — ESU PENCIL SMOKE EVAC W/ROCKER SWITCH 0703-047-000

## (undated) DEVICE — SUCTION TIP YANKAUER W/O VENT K86

## (undated) DEVICE — SOL WATER IRRIG 1000ML BOTTLE 2F7114

## (undated) DEVICE — ESU GROUND PAD ADULT W/CORD E7507

## (undated) DEVICE — DRAPE IOBAN INCISE 23X17" 6650EZ

## (undated) DEVICE — NDL 30GA 0.5" 305106

## (undated) DEVICE — BLADE KNIFE SURG 15 371115

## (undated) DEVICE — LINEN TOWEL PACK X5 5464

## (undated) DEVICE — DRAPE U SPLIT 74X120" 29440

## (undated) DEVICE — SU VICRYL 3-0 SH 27" UND J416H

## (undated) DEVICE — STRAP UNIVERSAL POSITIONING 2-PIECE 4X47X76" 91-287

## (undated) DEVICE — SU SILK 3-0 SH 30" K832H

## (undated) DEVICE — GLOVE PROTEXIS MICRO 5.5  2D73PM55

## (undated) DEVICE — BASIN EMESIS STERILE  SSK9005A

## (undated) DEVICE — SYR 05ML LL W/O NDL

## (undated) DEVICE — MARKER MARGIN MARKER STD 6 COLOR SGL USE MMS6

## (undated) DEVICE — SYR 10ML FINGER CONTROL W/O NDL 309695

## (undated) DEVICE — SU SILK 3-0 TIE 12X30" A304H

## (undated) DEVICE — ESU ELEC BLADE 2.75" COATED/INSULATED E1455

## (undated) DEVICE — SUCTION MANIFOLD NEPTUNE 2 SYS 4 PORT 0702-020-000

## (undated) DEVICE — PACK MINOR CUSTOM ASC

## (undated) RX ORDER — PROPOFOL 10 MG/ML
INJECTION, EMULSION INTRAVENOUS
Status: DISPENSED
Start: 2018-01-05

## (undated) RX ORDER — ACETAMINOPHEN 325 MG/1
TABLET ORAL
Status: DISPENSED
Start: 2018-01-05

## (undated) RX ORDER — GABAPENTIN 300 MG/1
CAPSULE ORAL
Status: DISPENSED
Start: 2018-01-05

## (undated) RX ORDER — FENTANYL CITRATE 50 UG/ML
INJECTION, SOLUTION INTRAMUSCULAR; INTRAVENOUS
Status: DISPENSED
Start: 2018-01-05

## (undated) RX ORDER — EPINEPHRINE 1 MG/ML
INJECTION, SOLUTION, CONCENTRATE INTRAVENOUS
Status: DISPENSED
Start: 2018-01-05

## (undated) RX ORDER — LIDOCAINE HYDROCHLORIDE 10 MG/ML
INJECTION, SOLUTION EPIDURAL; INFILTRATION; INTRACAUDAL; PERINEURAL
Status: DISPENSED
Start: 2018-01-05

## (undated) RX ORDER — ISOSULFAN BLUE 50 MG/5ML
INJECTION, SOLUTION SUBCUTANEOUS
Status: DISPENSED
Start: 2018-01-05

## (undated) RX ORDER — OXYCODONE HYDROCHLORIDE 5 MG/1
TABLET ORAL
Status: DISPENSED
Start: 2018-01-05

## (undated) RX ORDER — BUPIVACAINE HYDROCHLORIDE 2.5 MG/ML
INJECTION, SOLUTION EPIDURAL; INFILTRATION; INTRACAUDAL
Status: DISPENSED
Start: 2018-01-05

## (undated) RX ORDER — ONDANSETRON 2 MG/ML
INJECTION INTRAMUSCULAR; INTRAVENOUS
Status: DISPENSED
Start: 2018-01-05